# Patient Record
Sex: FEMALE | Race: WHITE
[De-identification: names, ages, dates, MRNs, and addresses within clinical notes are randomized per-mention and may not be internally consistent; named-entity substitution may affect disease eponyms.]

---

## 2019-01-30 ENCOUNTER — HOSPITAL ENCOUNTER (OUTPATIENT)
Dept: HOSPITAL 88 - MAMMO | Age: 69
End: 2019-01-30
Attending: OBSTETRICS & GYNECOLOGY
Payer: MEDICARE

## 2019-01-30 DIAGNOSIS — N64.89: Primary | ICD-10-CM

## 2019-01-31 NOTE — DIAGNOSTIC IMAGING REPORT
#[id number] - [id number]

#UNILATERAL RIGHT DIGITAL DIAGNOSTIC MAMMOGRAM WITH SPOT COMPRESSION: 1/30/2019

Comparison is made to exam dated:  12/31/2018 mammogram - [facility].  

Current study contains 2 films.  

There are scattered fibroglandular elements in the right breast.  

There are benign calcifications in the right breast.  The density previously described appears to 

press out.  No mass seen. 

No significant masses, calcifications, or other findings are seen in the breast.  

There has been no significant interval change.



IMPRESSION: BENIGN

There is no mammographic evidence of malignancy.  A 1 year screening mammogram is recommended. 

The patient will be notified by letter of the results.  





Kevin Tariq Jr., D.O.          

cw/:1/30/2019 12:33:36  



Imaging Technologist: Astrid GONZALES(R)(M), [facility]

letter sent: Normal Exam  

Mammogram BI-RADS: 2 Benign

## 2020-09-27 ENCOUNTER — HOSPITAL ENCOUNTER (INPATIENT)
Dept: HOSPITAL 88 - ER | Age: 70
LOS: 2 days | Discharge: HOME | DRG: 247 | End: 2020-09-29
Attending: INTERNAL MEDICINE | Admitting: INTERNAL MEDICINE
Payer: MEDICARE

## 2020-09-27 VITALS — BODY MASS INDEX: 33.38 KG/M2 | HEIGHT: 60 IN | WEIGHT: 170 LBS

## 2020-09-27 DIAGNOSIS — E11.9: ICD-10-CM

## 2020-09-27 DIAGNOSIS — G47.419: ICD-10-CM

## 2020-09-27 DIAGNOSIS — E78.5: ICD-10-CM

## 2020-09-27 DIAGNOSIS — K21.9: ICD-10-CM

## 2020-09-27 DIAGNOSIS — I24.9: ICD-10-CM

## 2020-09-27 DIAGNOSIS — E66.9: ICD-10-CM

## 2020-09-27 DIAGNOSIS — Z98.84: ICD-10-CM

## 2020-09-27 DIAGNOSIS — G47.33: ICD-10-CM

## 2020-09-27 DIAGNOSIS — I10: ICD-10-CM

## 2020-09-27 DIAGNOSIS — Z95.5: ICD-10-CM

## 2020-09-27 DIAGNOSIS — I25.2: ICD-10-CM

## 2020-09-27 DIAGNOSIS — I25.110: Primary | ICD-10-CM

## 2020-09-27 DIAGNOSIS — M10.9: ICD-10-CM

## 2020-09-27 LAB
ALBUMIN SERPL-MCNC: 4.7 G/DL (ref 3.5–5)
ALBUMIN/GLOB SERPL: 1.4 {RATIO} (ref 0.8–2)
ALP SERPL-CCNC: 105 IU/L (ref 40–150)
ALT SERPL-CCNC: 9 IU/L (ref 0–55)
ANION GAP SERPL CALC-SCNC: 19.4 MMOL/L (ref 8–16)
BASOPHILS # BLD AUTO: 0 10*3/UL (ref 0–0.1)
BASOPHILS NFR BLD AUTO: 0.9 % (ref 0–1)
BUN SERPL-MCNC: 38 MG/DL (ref 7–26)
BUN/CREAT SERPL: 28 (ref 6–25)
CALCIUM SERPL-MCNC: 11.1 MG/DL (ref 8.4–10.2)
CHLORIDE SERPL-SCNC: 107 MMOL/L (ref 98–107)
CK MB SERPL-MCNC: 2 NG/ML (ref 0–5)
CK SERPL-CCNC: 107 IU/L (ref 29–168)
CO2 SERPL-SCNC: 19 MMOL/L (ref 22–29)
DEPRECATED APTT PLAS QN: 27.5 SECONDS (ref 23.8–35.5)
DEPRECATED INR PLAS: 0.78
DEPRECATED NEUTROPHILS # BLD AUTO: 2.8 10*3/UL (ref 2.1–6.9)
EGFRCR SERPLBLD CKD-EPI 2021: 38 ML/MIN (ref 60–?)
EOSINOPHIL # BLD AUTO: 0.1 10*3/UL (ref 0–0.4)
EOSINOPHIL NFR BLD AUTO: 2.1 % (ref 0–6)
ERYTHROCYTE [DISTWIDTH] IN CORD BLOOD: 14.2 % (ref 11.7–14.4)
GLOBULIN PLAS-MCNC: 3.4 G/DL (ref 2.3–3.5)
GLUCOSE SERPLBLD-MCNC: 182 MG/DL (ref 74–118)
HCT VFR BLD AUTO: 38.5 % (ref 34.2–44.1)
HGB BLD-MCNC: 12.8 G/DL (ref 12–16)
LYMPHOCYTES # BLD: 0.9 10*3/UL (ref 1–3.2)
LYMPHOCYTES NFR BLD AUTO: 21.1 % (ref 18–39.1)
MCH RBC QN AUTO: 28.8 PG (ref 28–32)
MCHC RBC AUTO-ENTMCNC: 33.2 G/DL (ref 31–35)
MCV RBC AUTO: 86.5 FL (ref 81–99)
MONOCYTES # BLD AUTO: 0.4 10*3/UL (ref 0.2–0.8)
MONOCYTES NFR BLD AUTO: 9.9 % (ref 4.4–11.3)
NEUTS SEG NFR BLD AUTO: 64.9 % (ref 38.7–80)
PLATELET # BLD AUTO: 243 X10E3/UL (ref 140–360)
POTASSIUM SERPL-SCNC: 4.4 MMOL/L (ref 3.5–5.1)
PROTHROMBIN TIME: 11.3 SECONDS (ref 11.9–14.5)
RBC # BLD AUTO: 4.45 X10E6/UL (ref 3.6–5.1)
SODIUM SERPL-SCNC: 141 MMOL/L (ref 136–145)

## 2020-09-27 PROCEDURE — 99153 MOD SED SAME PHYS/QHP EA: CPT

## 2020-09-27 PROCEDURE — 85025 COMPLETE CBC W/AUTO DIFF WBC: CPT

## 2020-09-27 PROCEDURE — 84484 ASSAY OF TROPONIN QUANT: CPT

## 2020-09-27 PROCEDURE — 83735 ASSAY OF MAGNESIUM: CPT

## 2020-09-27 PROCEDURE — 93306 TTE W/DOPPLER COMPLETE: CPT

## 2020-09-27 PROCEDURE — 82553 CREATINE MB FRACTION: CPT

## 2020-09-27 PROCEDURE — 92928 PRQ TCAT PLMT NTRAC ST 1 LES: CPT

## 2020-09-27 PROCEDURE — 99152 MOD SED SAME PHYS/QHP 5/>YRS: CPT

## 2020-09-27 PROCEDURE — 83036 HEMOGLOBIN GLYCOSYLATED A1C: CPT

## 2020-09-27 PROCEDURE — 83880 ASSAY OF NATRIURETIC PEPTIDE: CPT

## 2020-09-27 PROCEDURE — 36415 COLL VENOUS BLD VENIPUNCTURE: CPT

## 2020-09-27 PROCEDURE — 85730 THROMBOPLASTIN TIME PARTIAL: CPT

## 2020-09-27 PROCEDURE — 82948 REAGENT STRIP/BLOOD GLUCOSE: CPT

## 2020-09-27 PROCEDURE — 93005 ELECTROCARDIOGRAM TRACING: CPT

## 2020-09-27 PROCEDURE — 93458 L HRT ARTERY/VENTRICLE ANGIO: CPT

## 2020-09-27 PROCEDURE — 71045 X-RAY EXAM CHEST 1 VIEW: CPT

## 2020-09-27 PROCEDURE — 80061 LIPID PANEL: CPT

## 2020-09-27 PROCEDURE — 99284 EMERGENCY DEPT VISIT MOD MDM: CPT

## 2020-09-27 PROCEDURE — 82550 ASSAY OF CK (CPK): CPT

## 2020-09-27 PROCEDURE — 84443 ASSAY THYROID STIM HORMONE: CPT

## 2020-09-27 PROCEDURE — 80053 COMPREHEN METABOLIC PANEL: CPT

## 2020-09-27 PROCEDURE — 85610 PROTHROMBIN TIME: CPT

## 2020-09-27 PROCEDURE — 84100 ASSAY OF PHOSPHORUS: CPT

## 2020-09-27 NOTE — XMS REPORT
Continuity of Care Document

                             Created on: 2020



SUPRIYA MOULTON

External Reference #: [id number]

: 1950

Sex: Female



Demographics





                          Address                   [address] [place]  [address]

 

                          Home Phone                [phone]

 

                          Preferred Language        English

 

                          Marital Status            Unknown

 

                          Zoroastrianism Affiliation     Unknown

 

                          Race                      Unknown

 

                                        Additional Race(s) 



White



 

                          Ethnic Group              Unknown





Author





                          Author                    [facility]

t

 

                          Organization              [facility]

 

                          Address                   [address]

[place]  [address]



 

                          Phone                     Unavailable







Support





                Name            Relationship    Address         Phone

 

                Luc Moulton ECON            Unknown         [phone]







Care Team Providers





                    Care Team Member Name Role                Phone

 

                    Brandi SILVA, Eli Garza PCP                 [phone]

 

                    PHYLLIS NUÑEZ Attphys             Unavailable

 

                    ESTEVAN MARIEE P.AJuve Attphys             Unavailable

 

                    THIAGO LIRIANO M.D. Attphys             Unavailable

 

                    Yunior Stanford MD Attphys             [phone]

 

                    PHYLLIS Rodriguez RD Attphys             Unavailable

 

                    Kyle KAPOOR, PEMA Sanders Attphys             Unavailable

 

                    NACHO Larson MD Attphys             [phone]

 

                    Verna Emmanuel Attphys             [phone]

 

                    Fahad SILVA, Atif Farnsworth Attphys             [phone]

 

                    Christina Colon MA  Attphys             Unavailable

 

                    TRACIE UNDERWOOD, APRN   Attphys             Unavailable

 

                    MAYNOR DONG   Attphys             Unavailable

 

                    LION WHARTON APRN  Attphys             Unavailable

 

                    CYNTHIA TALBOT NP Attphys             Unavailable

 

                    LUKE KIMBALL P.A. Attphys             Unavailable

 

                    LUCAS XAVIER M.D. Attphys             Unavailable

 

                    SERGEI ZAVALA NP Attphys             Unavailable

 

                    TRACIE UNDEROWOD NP     Attphys             Unavailable

 

                    MARIO JEAN M.D. Attphys             Unavailable

 

                    PENNY HE P.A. Attphys             Unavailable

 

                    SIMON CARR NP    Attphys             Unavailable

 

                    LUCERO STANFORD     Admphys             Unavailable







Payers





           Payer Name Policy Type Policy Number Effective Date Expiration Date S

ource MEDICAREMEDICARE PART A AND [id number]                     

[id number]         2016 00:00:00                     Kal Rdz

 

                [place] OF DIGNA OF [id number]

l                 [id number]       

2017 00:00:00                                 Kal Rdz







Problems





           Condition Name Condition Details Condition Category Status     Onset 

Date Resolution

Date            Last Treatment Date Treating Clinician Comments        Source

 

                          History of removal of laparoscopic gastric banding dev

ice History of removal of 

laparoscopic gastric banding device Disease Active  2020 00:00:00         

                        

Kal Rdz

 

                    GERD (gastroesophageal reflux disease) GERD (gastroesophagea

l reflux disease) 

Disease   Active    2019-10-24 00:00:00                                         

Kal Rdz

 

                          History of laparoscopic adjustable gastric banding His

tory of laparoscopic 

adjustable gastric banding Disease Active  2019-10-24 00:00:00                  

               Kal Rdz

 

       Hypertension Hypertension Disease Active 2019-10-24 00:00:00             

                Kal Rdz

 

       Hyperlipidemia Hyperlipidemia Disease Active 2019-10-24 00:00:00         

                    Kal dRz

 

             CAD (coronary artery disease) CAD (coronary artery disease) Disease

      Active       

2019-10-24 00:00:00                                                     Kal Rdz

 

       Diabetes mellitus Diabetes mellitus Disease Active 2019-10-24 00:00:00   

                          

Kal Rdz

 

                Obstructive sleep apnea syndrome Obstructive sleep apnea syndrom

e Disease         Active

           2019-10-24 00:00:00                                             [place]

[facility]

 

             Quang's deformity, right Quang's deformity, right Disease      

Active       2019 

00:00:00                                                         Kal Hoyos

st

 

                          History of Diabetes Mellitus Without Complication Hist

ory of Diabetes Mellitus 

Without Complication Problem Resolved                                         Un

[facility]

 

       History of cough History of cough Problem Resolved                       

             [facility]

[facility]

 

       History of snoring History of snoring Problem Resolved                   

                 [facility]

 

                          History of Abnormal finding on urinalysis History of A

bnormal finding on 

urinalysis Problem Resolved                                         [facility]

[facility]

 

                          History of Acute bronchitis due to infection History o

f Acute bronchitis due to 

infection Problem Resolved                                         [facility]

 

                          History of Acute drug-induced gout of foot, unspecifie

d laterality History of 

Acute drug-induced gout of foot, unspecified laterality Problem    Resolved     

                                

                                                    [facility]

ns

 

       Gout   Gout   Problem Active                                    [facility]

 

                          History of Acute maxillary sinusitis, recurrence not s

pecified History of Acute 

maxillary sinusitis, recurrence not specified Problem Resolved                  

                       

[facility]

 

                          History of Acute recurrent maxillary sinusitis History

 of Acute recurrent 

maxillary sinusitis Problem Resolved                                         [facility]

 

                          History of Acute upper respiratory infection History o

f Acute upper respiratory 

infection Problem Resolved                                         [facility]

 

       History of Acute UTI History of Acute UTI Problem Resolved               

                     [facility]

 

           History of contact dermatitis History of contact dermatitis Problem  

  Resolved               

                                                                [facility]

 

       History of hyperlipidemia History of hyperlipidemia Problem Resolved     

                               

[facility]

 

          Diabetes type 2, uncontrolled Diabetes type 2, uncontrolled Problem   

Active                         

                                                            [facility] 

[facility]

 

                History of essential hypertension History of essential hypertens

ion Problem         

Resolved                                                          [facility]

 

       History of hematuria History of hematuria Problem Resolved               

                     [facility]

 

                History of viral gastroenteritis History of viral gastroenteriti

s Problem         

Resolved                                                          [facility]

 

        History of Intercostal pain History of Intercostal pain Problem Resolved

                          

                                                    [facility]

ns

 

       Personal history of gout Personal history of gout Problem Resolved       

                             

[facility]

 

                History of myocardial infarction History of myocardial infarctio

n Problem         

Resolved                                                          [facility]

 

       Narcolepsy Narcolepsy Problem Active                                    [facility]

 

                History of Noninfectious diarrhea History of Noninfectious diarr

hea Problem         

Resolved                                                          [facility]

 

       History of Other fatigue History of Other fatigue Problem Resolved       

                             

[facility]

 

                    History of Pain, foot, left, chronic History of Pain, foot, 

left, chronic 

Problem   Resolved                                                    [facility]

 

        History of Right foot pain History of Right foot pain Problem Resolved  

                                

                                        [facility]

 

       History of sciatica History of sciatica Problem Resolved                 

                   [facility]

 

                          History of Sciatica of right side associated with diso

rder of lumbar spine 

History of Sciatica of right side associated with disorder of lumbar spine 

Problem   Resolved                                                    [facility]

 

       History of sinusitis History of sinusitis Problem Resolved               

                     [facility]

 

                    History of Swelling of foot joint, left History of Swelling 

of foot joint, left 

Problem   Resolved                                                    [facility]

 [facility]

 

       Edema  Edema  Problem Active                                    [facility]

 

       Amezcua neuroma, left Amezcua neuroma, left Problem Active                 

                   [facility]

 

       Post herpetic neuralgia Post herpetic neuralgia Problem Active           

                         

[facility]

 

        Bleeding risk due to aspirin Bleeding risk due to aspirin Problem Active

                           

                                                    [facility]

ns

 

       Hypertriglyceridemia Hypertriglyceridemia Problem Active                 

                   [facility]

 

       Other insomnia Other insomnia Problem Active                             

       [facility] 

[facility]

 

       Lumbar facet arthropathy Lumbar facet arthropathy Problem Active         

                           

[facility]

 

                          Lumbar adjacent segment disease with spondylolisthesis

 Lumbar adjacent segment 

disease with spondylolisthesis Problem Active                                   

       [facility] 

[facility]

 

                    Bulge of lumbar disc without myelopathy Bulge of lumbar disc

 without myelopathy 

Problem   Active                                                      [facility]

 

                          Antiplatelet or antithrombotic long-term use Antiplate

let or antithrombotic 

long-term use Problem Active                                          [facility]

 

       Vitamin D deficiency Vitamin D deficiency Problem Active                 

                   [facility]

 

       Hypercalcemia Hypercalcemia Problem Active                               

     [facility] 

[facility]

 

                Controlled type 2 diabetes mellitus Controlled type 2 diabetes m

ellitus Problem         

Active                                                            [facility]

 

                Essential (primary) hypertension Essential (primary) hypertensio

n Problem         Active

                                                                  [facility]

 

       Hyperlipidemia Hyperlipidemia Problem Active                             

       [facility] 

[facility]

 

       Depressive disorder Depressive disorder Problem Active                   

                 [facility]

 

       Acute sinusitis Acute sinusitis Problem Active                           

         [facility]

 

       Anxiety with depression Anxiety with depression Problem Active           

                         

[facility]

 

       Obesity (BMI 30.0-34.9) Obesity (BMI 30.0-34.9) Problem Active           

                         

[facility]

 

       Vaginitis Vaginitis Problem Active                                    [facility]







Allergies, Adverse Reactions, Alerts





        Allergy Name Allergy Type Status  Severity Reaction(s) Onset Date Inacti

ve Date 

Treating Clinician        Comments                  Source

 

           Codeine    Propensity to adverse reactions to drug Active            

    GI Intolerance 

2020 00:00:00                                 n/v             [facility]

[facility]







Family History





           Family Member Diagnosis  Comments   Start Date Stop Date  Source

 

           Unknown Family Member Family history of Stroke Syndrome Family Histor

y                       

[facility]

 

           Grandmother Family history of Heart Disease                          

        [facility]

 

           Grandmother Family history of Diabetes Mellitus                      

            [facility]

 

           Grandmother Family history of Hypertension                           

       [facility]

 

           Grandmother Family history of Pure Hypercholesterolemia              

                    [facility]

 

           Mother     Family history of Heart Disease                           

       [facility]

 

           Mother     Family history of Diabetes Mellitus                       

           [facility]

 

           Mother     Family history of Hypertension                            

      [facility]

 

           Mother     Family history of Pure Hypercholesterolemia               

                   [facility]

 

           Father     Family history of Heart Disease                           

       [facility]

 

           Father     Family history of Hypertension                            

      [facility]

 

           Father     Family history of Pure Hypercholesterolemia               

                   [facility]

 

           Natural father Alcohol abuse                                  [place]

 [facility]

 

           Natural father Heart attack                                  [place] 

[facility]

 

           Natural father Heart disease                                  [place]

 [facility]

 

           Natural mother Alcohol abuse                                  [place]

 [facility]

 

           Natural mother Aneurysm                                    [facility]

[facility]

 

           Natural mother Diabetes                                    [place] [facility]

[facility]

 

           Natural sister Cancer                                      [place]

[facility]







Social History





           Social Habit Start Date Stop Date  Quantity   Comments   Source

 

           History SDOH Alcohol Std Drinks                                      

       [facility]

 

           History SDOH Alcohol Binge                                           

  [facility]

 

           Sex Assigned At Birth                                             [facility]

 

           Tobacco use and exposure 2020 00:00:00 2020 00:00:00 Annabel maya used            

[facility]

 

                Alcohol intake  2020 00:00:00 2020 00:00:00 Current 

non-drinker of 

alcohol (finding)                                   [facility]

 

           History SDOH Alcohol Frequency 2019 00:00:00 2019 00:00:0

0 1                     

Kal Rdz







                Smoking Status  Start Date      Stop Date       Source

 

                Ex-smoker (finding)                                 [facility]

 

                Never smoker                                    Kal Santoyo

t







Medications





             Ordered Medication Name Filled Medication Name Start Date   Stop Da

te    Current 

Medication? Ordering Clinician Indication Dosage     Frequency  Signature (SIG) 

Comments                  Components                Source

 

                          Nystatin 485766 UNIT/GM External Ointment Nystatin 100

000 UNIT/GM External 

Ointment 2020 00:00:00         Yes     ESTEVAN MARIEE P.A.                 

Q0.3333D APPLY 2-3 

TIMES DAILY TO AFFECTED AREA(S).                                         [facility]

 

                    Nystatin 619621 UNIT/GM External Powder Nystatin 583744 UNIT

/GM External Powder 

2020 00:00:00           Yes       ESTEVAN MARIEE P.A.                     Q

0.3333D  APPLY 2-3 TIMES DAILY 

TO AFFECTED AREA(S).                                         [facility]

 [facility]

 

                          Vitamin D (Ergocalciferol) 1.25 MG (39058 UT) Oral Cap

latricia Vitamin D 

(Ergocalciferol) 1.25 MG (06599 UT) Oral Capsule 2020 00:00:00            

     Yes             THIAGO LIRIANO M.D.                               take 1 cap PO once weekly             

        [facility]

 

             cholecalciferol, vitamin D3, (DIALYVITE VITAMIN D ORAL)            

  2020 11:22:59              

Yes                                     Take by mouth.                 Kal KUMAR

ethodi

 

                    insulin regular, human (NOVOLIN R REGULAR U-100 INSULN INJ) 

                    2020 

11:22:59         Yes                     20U     Q.5D    Inject 20 Units as dire

cted 2 (two) times a day.          

                                        Kal Rdz

 

       carvedilol (COREG) 3.125 MG tablet        2020 11:22:59        Yes 

                 25mg   Q.5D   

Take 25 mg by mouth 2 (two) times a day with meals.                             

             Kal Rdz

 

        aspirin (ECOTRIN) 81 MG enteric coated tablet         2020 11:22:5

9         Yes                     

81mg         QD           Take 81 mg by mouth daily.                           LAZARO Rdz

 

       atorvastatin (LIPITOR) 40 MG tablet        2020 11:22:59        Yes

                  40mg   QD     Take

40 mg by mouth daily.                                         Kal Rdz

 

       famotidine (PEPCID) 20 MG tablet        2020 11:22:59        Yes   

               20mg   Q.5D   Take 

20 mg by mouth 2 (two) times a day as needed for indigestion.                   

                       Kal Rdz

 

        nitroglycerin (NITROSTAT) 0.4 MG SL tablet         2020 11:22:59  

       Yes                     .4mg    

                          Place 0.4 mg under the tongue every 5 (five) minutes a

s needed for chest pain. 

                                                    Kal Rdz

 

       irbesartan (AVAPRO) 300 MG tablet        2020 11:22:59        Yes  

                300mg  QD     Take 

300 mg by mouth nightly.                                         Kal Hoyos

st

 

       UNABLE TO FIND        2020 13:24:28 2020 00:00:00 No         

                        Med Name: 

***                                                         Kal Rdz

 

                isosorbide dinitrate (ISORDIL) 40 MG tablet                 2020 06:52:43 2020 

00:00:00   No                               40mg       Q.[id number] Ta

ke 40 mg by mouth 3 (three) times 

a day.                                                      Kal Rdz

 

                HYDROcodone-acetaminophen (NORCO) 5-325 mg per tablet           

      2020 00:00:00 

2020 23:59:00 No                    acute pain 1{tbl}     Q6H        Take 

1-2 tablets by mouth every 6

(six) hours as needed for moderate pain or severe pain for up to 30 days .acute 
pain.                                                       Kal Rdz

 

           modafinil (PROVIGIL) 100 MG tablet            2020 13:21:27  00:00:00 No         

                    100mg     QD        Take 100 mg by mouth daily.             

        Kal Rdz

 

                ibuprofen (ADVIL,MOTRIN) 800 MG tablet                 2019-10-2

4 12:12:43 2019-10-24 00:00:00

           No                               800mg      Q6H        Take 800 mg by

 mouth every 6 (six) hours as needed for mild 

pain.                                                       Kal Rdz

 

          fexofenadine HCl (ALLEGRA ORAL)           2019-10-24 12:12:39 2019-10-

24 00:00:00 No                   

                                 Take by mouth.                       Kal avitia

 

        metformin HCl (METFORMIN ORAL)         2019-10-24 12:12:27 2019-10-24 00

:00:00 No                      

1000mg       Q.5D         Take 1,000 mg by mouth 2 (two) times a day.           

                 Kal Rdz

 

                          ReliOn Insulin Syringe 31G X 15/64" 0.3 ML ReliOn Insu

ronny Syringe 31G X 15/64" 

0.3 ML 2019 00:00:00        Yes    THIAGO LIRIANO M.D.                      

use to inject 3-5xs         

                                        [facility]

 

                          NovoLIN R ReliOn 100 UNIT/ML Injection Solution NovoLI

N R ReliOn 100 UNIT/ML 

Injection Solution 2019 00:00:00         Yes     THIAGO LIRIANO M.D.        

                 inject 5-10

U SC q1/2AC                                                 [facility] 

Physicians

 

          ondansetron (ZOFRAN) 4 MG tablet           2019 00:00:00 2019-10

-24 00:00:00 No                  

Quang's deformity, right 4mg                 Q8H                 Take 1 tablet

 (4 mg total) by mouth every 8 

(eight) hours as needed for nausea or vomiting.                                 

        Kal Rdz

 

           irbesartan (AVAPRO) 300 MG tablet            2019 00:00:00 2019

-10-24 00:00:00 No          

                    300mg     QD        Take 300 mg by mouth daily.             

        Kal Rdz

 

       clopidogrel (PLAVIX) 75 mg tablet        2019 00:00:00        Yes  

                75mg   QD     Take 

75 mg by mouth daily.                                         Kal Rdz

 

             allopurinol (ZYLOPRIM) 300 MG tablet              2018 00:00:

00 2019-10-24 00:00:00 

No                      300mg           300 mg.                 Kal Santoyo

t

 

           fenofibrate (TRICOR) 145 MG tablet            2018 00:00:00 201

9-10-24 00:00:00 No         

                                        TAKE ONE (1) TABLET(S) BY MOUTH DAILY.  

                   Kal BallardOn Blood Glucose Test In Vitro Strip ReliOn Blood 

Glucose Test In Vitro 

Strip   2018 00:00:00         Yes     THIAGO LIRIANO M.D.                 Q0

.5D   use to check BG at 

least 3xs daily                                             [facility] 

Physicians

 

                          NovoLIN N ReliOn 100 UNIT/ML Subcutaneous Suspension N

ovoLIN N ReliOn 100 

UNIT/ML Subcutaneous Suspension 2018 00:00:00           Yes       THIAGO PAT M.D.                     

                inject 20 U in AM and 24 U in PM SC                             

    [facility]

 

                    D 5000 125 MCG (5000 UT) Oral Capsule D 5000 125 MCG (5000 U

T) Oral Capsule 

2017-10-17 00:00:00        Yes    THIAGO LIRIANO M.D.                      take 1 

tab daily               

[facility]

 

                    Allopurinol 300 MG Oral Tablet Allopurinol 300 MG Oral Table

t 2017-10-06 

00:00:00        Yes    LION HOLLINGSWORTH        1      QD     TAKE 1 TABLET DAILY 

AS DIRECTED.               

[facility]

 

                Irbesartan 300 MG Oral Tablet Irbesartan 300 MG Oral Tablet 2013 00:00:00 

        Yes     ESTEVAN NEGRETEOS P.A.         1       QD      TAKE 1 TABLET DAILY. 

                [facility] 

[facility]

 

                    metFORMIN HCl - 500 MG Oral Tablet metFORMIN HCl - 500 MG Or

al Tablet 2013

00:00:00         Yes     THIAGO LIRIANO M.D.                 Q0.5D   TAKE 2 TABLET

S BY MOUTH TWICE DAILY  

                                                    [facility]

 

     Aspirin 81 MG TABS Aspirin 81 MG TABS           Yes            1    QD   TA

KE 1 TABLET DAILY.           

[facility]

 

                    Clopidogrel Bisulfate 75 MG Oral Tablet Clopidogrel Bisulfat

e 75 MG Oral Tablet 

              Yes                  1      QD     TAKE 1 TABLET DAILY.           

    [facility]

 

     Allegra CAPS Allegra CAPS           Yes                      as needed     

      [facility] 

[facility]

 

     Ibuprofen TABS Ibuprofen TABS           Yes                                

     [facility] 

[facility]

 

       Carvedilol 12.5 MG Oral Tablet Carvedilol 12.5 MG Oral Tablet            

   Yes                                2 

tabs BID                                                    [facility] 

[facility]

 

       Fenofibrate 145 MG Oral Tablet Fenofibrate 145 MG Oral Tablet            

   Yes                         QD     

TAKE 1 TABLET DAILY WITH FOOD.                                         [facility]







Immunizations





           Ordered Immunization Name Filled Immunization Name Date       Status 

    Comments   Source

 

           Influenza             2007-10-16 00:00:00 Completed             [facility]

 

                Fluzone High-Dose 0.5 ML Intramuscular Suspension Prefilled Syri

nge                 Unknown         

Completed                                           [facility]

ns

 

           Shingrix 50 MCG Intramuscular Suspension Reconstituted            Unk

nown    Completed             

[facility]







Vital Signs





             Vital Name   Observation Time Observation Value Comments     Source

 

                Systolic blood pressure 2020 10:52:00 148 mm[Hg]      Loca

tion: LUE; Position: 

Sitting                                 [facility]

 

                Diastolic blood pressure 2020 10:52:00 72 mm[Hg]       Loc

ation: LUE; Position: 

Sitting                                 [facility]

 

             Body height  2020 10:52:00 60 [in_us]                [facility]

 

             Weight       2020 10:52:00 172.4375 [lb_av]              [facility]

 

             Body mass index (BMI) [Ratio] 2020 10:52:00 33.68 kg/m2      

         [facility]

 

             Body temperature 2020 10:52:00 99 [degF]    Method: Temporal 

[facility]

 

             Heart Rate   2020 10:52:00 89 /min                   [facility]

 

             Respiratory rate 2020 10:52:00 16 /min                   [facility]

 

                Systolic blood pressure 2020 11:27:00 167 mm[Hg]      Loca

tion: LUE; Position: 

Sitting                                 [facility]

 

                Diastolic blood pressure 2020 11:27:00 89 mm[Hg]       Loc

ation: LUE; Position: 

Sitting                                 [facility]

 

             Heart Rate   2020 11:27:00 69 /min      Location: L Radial; Q

uality: Normal 

[facility]

 

                Systolic blood pressure 2020 11:24:00 172 mm[Hg]      Loca

tion: LUE; Position: 

Sitting                                 [facility]

 

                Diastolic blood pressure 2020 11:24:00 70 mm[Hg]       Loc

ation: LUE; Position: 

Sitting                                 [facility]

 

             Heart Rate   2020 11:24:00 69 /min      Location: L Radial; Q

uality: Normal 

[facility]

 

             Body height  2020 11:24:00 60 [in_us]                [facility]

 

             Weight       2020 11:24:00 169.5625 [lb_av]              [facility]

 

             Body mass index (BMI) [Ratio] 2020 11:24:00 33.12 kg/m2      

         [facility]

 

             Body temperature 2020 11:24:00 97.8 [degF]  Method: Oral [facility]

 

             Body height  2020 11:16:00 152.4 cm                  [facility]

 

             Body weight  2020 11:16:00 74.844 kg                 [facility]

 

             BMI          2020 11:16:00 32.22 kg/m2               [facility]

 

                Systolic blood pressure 2020 10:00:00 123 mm[Hg]      Loca

tion: LUE; Position: 

Sitting                                 [facility]

 

                Diastolic blood pressure 2020 10:00:00 68 mm[Hg]       Loc

ation: LUE; Position: 

Sitting                                 [facility]

 

             Body height  2020 10:00:00 60 [in_us]                [facility]

 

             Weight       2020 10:00:00 162.5625 [lb_av]              [facility]

 

             Body mass index (BMI) [Ratio] 2020 10:00:00 31.75 kg/m2      

         [facility]

 

             Heart Rate   2020 10:00:00 69 /min                   [facility]

 

                Systolic blood pressure 2020 10:13:00 158 mm[Hg]      Loca

tion: LUE; Position: 

Sitting                                 [facility]

 

                Diastolic blood pressure 2020 10:13:00 82 mm[Hg]       Loc

ation: LUE; Position: 

Sitting                                 [facility]

 

             Body height  2020 10:13:00 60 [in_us]                [facility]

 

             Weight       2020 10:13:00 167.375 [lb_av]              [facility]

 

             Body mass index (BMI) [Ratio] 2020 10:13:00 32.69 kg/m2      

         [facility]

 

             Body temperature 2020 10:13:00 98.4 [degF]  Method: Oral [facility]

 

             Heart Rate   2020 10:13:00 69 /min      Location: L Brachial 

Artery;  [facility]

 

             O2 SAT       2020 10:13:00 97 %         Source: RA   [facility]

 

             Systolic blood pressure 2020 09:25:00 185 mm[Hg]             

   [facility]

 

             Diastolic blood pressure 2020 09:25:00 95 mm[Hg]             

    [facility]

 

             Heart rate   2020 09:25:00 78 /min                   [facility]

 

             Body temperature 2020 09:25:00 36.67 Carolyn                 [facility]

 

             Respiratory rate 2020 12:28:00 20 /min                   [facility]

 

                    Oxygen saturation in Arterial blood by Pulse oximetry  12:28:00 95 

/min                                                [facility]

 

             BP Systolic  2019 10:14:00 150 mm[Hg]   Location: SKYLA; Positi

on: Sitting 

[facility]

 

             BP Diastolic 2019 10:14:00 75 mm[Hg]    Location: LUE; Positi

on: Sitting 

[facility]

 

             Height       2019 10:14:00 60 [in_us]                [facility]

 

             Weight       2019 10:14:00 163.25 [lb_av]              [facility]

 

             Body Mass Index Calculated 2019 10:14:00 31.88 kg/m2         

      [facility]

 [facility]

 

             Heart Rate   2019 10:14:00 51 /min                   [facility]

 

             BP Systolic  2019 09:34:00 124 mm[Hg]   Location: LUE; Positi

on: Sitting 

[facility]

 

             BP Diastolic 2019 09:34:00 63 mm[Hg]    Location: LUE; Pos[facility]

on: Sitting 

[facility]

 

             Height       2019 09:34:00 60 [in_us]                [facility]

 

             Weight       2019 09:34:00 160.125 [lb_av]              [facility]

 

             Body Mass Index Calculated 2019 09:34:00 31.27 kg/m2         

      [facility]

 [facility]

 

             Heart Rate   2019 09:34:00 71 /min                   [facility]

 

             BP Systolic  2019 09:31:00 135 mm[Hg]   Location: KERRYE; Positi

on: Sitting 

[facility]

 

             BP Diastolic 2019 09:31:00 70 mm[Hg]    Location: LUE; Positi

on: Sitting 

[facility]

 

             Height       2019 09:31:00 60 [in_us]                [facility]

 

             Weight       2019 09:31:00 158.0 [lb_av]              [facility]

 

             Body Mass Index Calculated 2019 09:31:00 30.86 kg/m2         

      [facility]

 [facility]

 

             Heart Rate   2019 09:31:00 63 /min                   [facility]

 

             BP Systolic  2019 10:47:00 130 mm[Hg]   Location: LUE; Positi

on: Sitting 

[facility]

 

             BP Diastolic 2019 10:47:00 75 mm[Hg]    Location: LUE; Pos[place]

on: Sitting 

[facility]

 

             Height       2019 10:47:00 60 [in_us]                [facility]

 

             Weight       2019 10:47:00 162.375 [lb_av]              Unive

[facility]

 

             Body Mass Index Calculated 2019 10:47:00 31.71 kg/m2         

      [facility]

 [facility]

 

             Temperature  2019 10:47:00 98.4 [degF]  Method: Temporal [facility]

 

             Heart Rate   2019 10:47:00 74 /min      Location: L Brachial 

Artery;  [facility]

 

             Respiration Rate 2019 10:47:00 16 /min      [facility]

 

             BP Systolic  2018 09:42:00 150 mm[Hg]   Location: RUE; Positi

on: Sitting 

[facility]

 

             BP Diastolic 2018 09:42:00 77 mm[Hg]    Location: RUE; Pos[place]

on: Sitting 

[facility]

 

             Height       2018 09:42:00 60 [in_us]                [facility]

 

             Weight       2018 09:42:00 147.5625 [lb_av]              [facility]

 

             Body Mass Index Calculated 2018 09:42:00 28.82 kg/m2         

      [facility]

 

             Temperature  2018 09:42:00 98.5 [degF]  Method: Temporal [facility]

 [facility]

 

             Heart Rate   2018 09:42:00 77 /min                   [facility]

 

             Respiration Rate 2018 09:42:00 14 /min                   [facility]

 

             BP Systolic  2018 10:53:00 150 mm[Hg]   Location: LUE; Positi

on: Sitting 

[facility]

 

             BP Diastolic 2018 10:53:00 70 mm[Hg]    Location: LUE; [place]

on: Sitting 

[facility]

 

             BP Systolic  2018 10:04:00 166 mm[Hg]   Location: LUE; [place]

on: Sitting 

[facility]

 

             BP Diastolic 2018 10:04:00 72 mm[Hg]    Location: LUE; [place]

on: Sitting 

[facility]

 

             Height       2018 10:04:00 60 [in_us]                [facility]

 

             Weight       2018 10:04:00 148.375 [lb_av]              Unive

[facility]

 

             Body Mass Index Calculated 2018 10:04:00 28.98 kg/m2         

      [facility]

 [facility]

 

             Heart Rate   2018 10:04:00 57 /min                   [facility]

 

             BP Systolic  2018 13:06:00 159 mm[Hg]   Location: SKYLA; Positi

on: Sitting 

[facility]

 

             BP Diastolic 2018 13:06:00 70 mm[Hg]    Location: KERRYE; [place]

on: Sitting 

[facility]

 

             Height       2018 13:06:00 60 [in_us]                [facility]

 

             Weight       2018 13:06:00 160.1875 [lb_av]              [facility]

 

             Body Mass Index Calculated 2018 13:06:00 31.28 kg/m2         

      [facility]

 

             Heart Rate   2018 13:06:00 80 /min                   [facility]

 

             BP Systolic  2018-06-15 14:44:00 138 mm[Hg]   Location: KERRYE; Positi

on: Sitting 

[facility]

 

             BP Diastolic 2018-06-15 14:44:00 76 mm[Hg]    Location: KERRYE; Pos[facility]

on: Sitting 

[facility]

 

             Heart Rate   2018-06-15 14:44:00 72 /min      Location: L Brachial 

Artery;  [facility]

 

             BP Systolic  2018-06-15 13:24:00 144 mm[Hg]   Location: SKYLA; Positi

on: Sitting 

[facility]

 

             BP Diastolic 2018-06-15 13:24:00 71 mm[Hg]    Location: SKYLA; Positi

on: Sitting 

[facility]

 

             Height       2018-06-15 13:24:00 60 [in_us]                [facility]

 

             Weight       2018-06-15 13:24:00 159.1875 [lb_av]              [facility]

 

             Body Mass Index Calculated 2018-06-15 13:24:00 31.09 kg/m2         

      [facility]

 

             Temperature  2018-06-15 13:24:00 97 [degF]    Method: Temporal [facility]

 

             Heart Rate   2018-06-15 13:24:00 72 /min      Location: L Brachial 

Artery;  [facility]

 

             Respiration Rate 2018-06-15 13:24:00 16 /min      Quality: Normal [facility]

[facility] [place]

 [facility]

 

             BP Systolic  2018 11:31:00 151 mm[Hg]   Location: SKYLA; Positi

on: Sitting 

[facility]

 

             BP Diastolic 2018 11:31:00 81 mm[Hg]    Location: SKYLA; Positi

on: Sitting 

[facility]

 

             Height       2018 11:31:00 60 [in_us]                [facility]

 

             Weight       2018 11:31:00 161.1875 [lb_av]              Univ

[facility]

 

             Body Mass Index Calculated 2018 11:31:00 31.48 kg/m2         

      [facility]

 

             Heart Rate   2018 11:31:00 65 /min                   [facility]

 

             BP Systolic  2018 11:52:00 136 mm[Hg]   Location: SKYLA; Positi

on: Sitting 

[facility]

 

             BP Diastolic 2018 11:52:00 76 mm[Hg]    Location: SKYLA; Positi

on: Sitting 

[facility]

 

             Height       2018 11:52:00 60 [in_us]                [facility]

 

             Weight       2018 11:52:00 169.375 [lb_av]              Unive

[facility]

 

             Body Mass Index Calculated 2018 11:52:00 33.08 kg/m2         

      [facility]

 

             Heart Rate   2018 11:52:00 80 /min                   [facility]

 

             BP Systolic  2018 10:31:00 152 mm[Hg]   Location: SKYLA; Positi

on: Sitting 

[facility]

 

             BP Diastolic 2018 10:31:00 82 mm[Hg]    Location: SKYLA; [place]

on: Sitting 

[facility]

 

             Height       2018 10:31:00 60 [in_us]                [facility]

 

             Weight       2018 10:31:00 171.375 [lb_av]              Unive

[facility]

 

             Body Mass Index Calculated 2018 10:31:00 33.47 kg/m2         

      [facility]

 [facility]

 

             Temperature  2018 10:31:00 97.7 [degF]  Method: Temporal Univ

[facility]

 [facility]

 

             Heart Rate   2018 10:31:00 89 /min      Location: L Brachial 

Artery;  [facility]

 

             Respiration Rate 2018 10:31:00 16 /min      Quality: Normal U

[facility]

 [facility]







Procedures





                Procedure       Date / Time Performed Performing Clinician Sourc

e

 

                [QL] CMP W/EGFR 2020 00:00:00                 Graham o

[facility]

 

                [QL] LIPID PANEL 2020 00:00:00                 [facility]

 

                [QL] VITAMIN D, 25-HYDROXY, LC/MS/MS 2020 00:00:00        

         [facility] 

[facility]

 

                [QL] CMP W/EGFR 2020 00:00:00                 [facility] [place] [facility]

 

                [QL] LIPID PANEL 2020 00:00:00                 [facility]

 

                [QL] MICROALBUMIN, RANDOM URINE (W/CREATININE) 2020 00:00:

00                 [facility]

 

                [QL] CBC (INCLUDES DIFF/PLT) 2020 00:00:00                

 [facility] 

[facility]

 

                [QL] VITAMIN D, 25-HYDROXY, LC/MS/MS 2020 00:00:00        

         [facility] 

[facility]

 

                [QL] TSH, 3RD GENERATION W/REFLEX TO FT4 2020 00:00:00    

             [facility]

 

                          AMB REFERRAL TO  WEIGHT MANAGEMENT - MEDICAL NUTRITI

ON THERAPY 2020 

12:56:31                  Lucero Stanford

 

                POC GLUCOSE     2020 11:22:00 Lucero Stanford

 

                SURGICAL PATHOLOGY REQUEST 2020 09:23:00 Lucero Stanford

 

                MT AN ELECTIVE ENDOTRACHEAL AIRWAY 2020 08:55:14 LANIE Emmanuel

 

                REMOVAL, GASTRIC BAND, LAPAROSCOPIC 2020 08:17:00 Lucero Stanford

 

                POC GLUCOSE     2020 07:32:00 Lucero Stanford

 

                ECG 12-LEAD     2020 14:11:13 Lucero Stanford

 

                HC COMPLETE BLD COUNT W/AUTO DIFF 2020 14:00:00 Lucero Stanford

 

                HEMOGLOBIN A1C  2020 14:00:00 Lucero Stanford

 

                TYPE AND SCREEN 2020 13:56:00 Lucero Stanford

 

                BASIC METABOLIC PANEL 2020 12:59:00 Lucero Stanford

 

                ESTIMATED GFR   2020 12:59:00 Lucero Stanford

 

                XR CALCANEUS 2+ VW RIGHT 2019 10:07:53 Javad Vásquez

 

                [QL] MICROALBUMIN, RANDOM URINE (W/CREATININE) 2019 00:00

:00                 [facility]

 

                ZZFL UGI W KUB  2019 07:35:00 Lucero Stanford

 

                History of Achilles tendon surgery 2019 00:00:00          

       [facility] 

[facility]

 

                History of Ostectomy of calcaneus for spur 2019 00:00:00  

               [facility]

 

                [QL] URINALYSIS, COMPLETE W/REFLEX TO CULTURE 2019 00:00:

00                 [facility]

 

                [O] Flu Test (in Office ) 2018 00:00:00                 [facility]

 

                [[facility]] CMP W/EGFR 2018 00:00:00                 [facility]

 

                [QL] VITAMIN D, 25-HYDROXY, LC/MS/MS 2018 00:00:00       

          [facility] 

[facility]

 

                [[facility]] LIPID PANEL 2018 00:00:00                 [facility]

 

                [QL] CMP W/EGFR 2018 00:00:00                 [facility]

 

                [QL] MICROALBUMIN, RANDOM URINE (W/CREATININE) 2018 00:00

:00                 [facility]

 [facility]

 

                [QL] CMP W/EGFR 2018 00:00:00                 [facility]

 

                [QL] MICROALBUMIN, RANDOM URINE (W/CREATININE) 2018 00:00

:00                 [facility]

 

                [[facility]] LIPID PANEL 2018 00:00:00                 [facility]

 

                [QL] VITAMIN D, 25-HYDROXY, LC/MS/MS 2017-10-17 00:00:00       

          [facility] 

[facility]

 

                [QL] CALCIUM, IONIZED 2017-10-17 00:00:00                 Unive

[facility]

 

                History of Laparosc Restrictive Proc Adjustable Gastric Band Adele

cement                                 

[facility]

 

                History of  Section                                 [facility]

[facility]

 

                History of Bladder Cystectomy                                 [facility]

[facility]

 

                History of Breast Surgery Removal Of Mammary Implant Bilateral  

                               [facility]

 

                History of Cystoscopy With Removal Of Ureteral Calculus         

                        [facility] 

[facility]

 

                History of Neuroplasty Median Nerve At Carpal Tunnel            

                     [facility] 

[facility]

 

                History of Complete Colonoscopy                                 

[facility]

 

                History of Renal Lithotripsy                                 [facility]

[facility]

 

                History of Breast Surgery Enlargement Procedure                 

                [facility]

 

                History of Breast Surgery Reduction Procedure                   

              [facility]

 

                History of Abdominoplasty                                 Univer

[facility]

 

                History of Exchange Of Intraocular Lens                         

        [facility]

 

                History of Cath Placement Of Stent 1                            

     [facility]

 

                History of Laparoscopic adjustable gastric banding              

                   [facility] 

[facility]







Plan of Care





             Planned Activity Planned Date Details      Comments     Source

 

                    Future Scheduled Test 2020 00:00:00 [QL] CMP W/EGFR [c

ode = [QL] CMP 

W/EGFR]                                             [facility]

 

                    Future Scheduled Test 2020 00:00:00 [QL] LIPID PANEL [

code = [QL] LIPID 

PANEL]                                              [facility]

 

                    Future Scheduled Test 2020 00:00:00 [QL] VITAMIN D, 25

-HYDROXY, LC/MS/MS 

[code = [QL] VITAMIN D, 25-HYDROXY, LC/MS/MS]                           [facility]

 

                    Future Scheduled Test 2020 00:00:00 INFLUENZA VACCINE 

[code = INFLUENZA 

VACCINE]                                            [facility]

 

                    Diagnostic Test Pending 2019 00:00:00 [QLH] CMP W/EGFR

 [code = [QLH] CMP 

W/EGFR]                                             [facility]

ns

 

                    Diagnostic Test Pending 2019 00:00:00 [QLH] VITAMIN D,

 25-HYDROXY, 

LC/MS/MS [code = [QLH] VITAMIN D, 25-HYDROXY, LC/MS/MS]                         

  [facility] 

[facility]

 

                    Diagnostic Test Pending 2018-10-22 00:00:00 [QLH] LIPID PANE

L [code = [QLH] 

LIPID PANEL]                                        [facility]

ns

 

                    Diagnostic Test Pending 2018-10-22 00:00:00 [QLH] CMP W/EGFR

 [code = [QLH] CMP 

W/EGFR]                                             [facility]

 

                    Diagnostic Test Pending 2018-10-22 00:00:00 [QLH] MICROALBUM

IN, RANDOM URINE 

(W/CREATININE) [code = 62625]                           [facility]

[facility]

 

                    Diagnostic Test Pending 2018-10-22 00:00:00 [QLH] LIPID PANE

L [code = [QLH] 

LIPID PANEL]                                        [facility]

ns

 

                    Diagnostic Test Pending 2018-10-22 00:00:00 [QLH] CMP W/EGFR

 [code = [QLH] CMP 

W/EGFR]                                             [facility]

ns

 

                    Diagnostic Test Pending 2018-10-22 00:00:00 [QLH] MICROALBUM

IN, RANDOM URINE 

(W/CREATININE) [code = 09248]                           [facility]

[facility]

 

                    Future Scheduled Test 2016 00:00:00 65+ PNEUMOCOCCAL V

ACCINE (1 of 1 - 

PPSV23) [code = 65+ PNEUMOCOCCAL VACCINE (1 of 1 - PPSV23)]                     

      [facility] Scheduled Test 2001 00:00:00 BREAST CANCER SCRE

ENING [code = BREAST

 CANCER SCREENING]                                  [facility] Scheduled Test 2001 00:00:00 COLONOSCOPY SCREEN

ING [code = 

COLONOSCOPY SCREENING]                              [facility] Scheduled Test 2001 00:00:00 SHINGLES VACCINES 

(#1) [code = 

SHINGLES VACCINES (#1)]                             [facility] Scheduled Test 1961 00:00:00 DIABETIC FOOT EXAM

 [code = DIABETIC 

FOOT EXAM]                                          [facility] Scheduled Test 1961 00:00:00 URINE MICROALBUMIN

 [code = URINE 

MICROALBUMIN]                                       [facility] Scheduled Test 1951 00:00:00 DIABETIC RETINAL E

YE EXAM [code = 

DIABETIC RETINAL EYE EXAM]                           [facility] Appointment 2020-10-21 13:30:00 NADIA DAS,         

      [facility]







Encounters





             Start Date/Time End Date/Time Encounter Type Admission Type Attendi

[facility]   Care Department Encounter ID    Source

 

             2020 10:45:00 2020 10:45:00 Appointment; ESTEVAN MARIEE P.A. CAMPOS, BERTHA, P.A. [facility], [address] [id number]

1        [facility]

 

             2020 11:30:2020 11:30:00 Appointment; THIAGO LIRIANO M.D. NASSIF, JULIA, M.D. [facility], [address] [id number] 

       [facility]

 

             2020 10:00:00 2020 10:00:00 Appointment; ESTEVAN MARIEE P.A. CAMPOS, BERTHA, P.A. [facility] [id number]        

[facility]

[facility]

 

        2020 00:00:2020 00:00:00 Outpatient         DANIEL STANFORD [facility]     

[id number]                           [facility]

 

             2020 10:00:00 2020 10:00:00 Appointment; THIAGO LIRIANO M.D. NASSIF, JULIA, M.D. [facility], [address] [id number] 

       [facility]

 

             2020 10:15:2020 10:15:00 Appointment; ESTEVAN MARIEE P.A. CAMPOS, BERTHA, P.A. [facility], [address] [id number]

0        [facility]

[facility]

 

        2020 00:00:00 2020 00:00:00 Outpatient         DANIEL STANFORD

[facility]     [address]     

[id number]                           [facility]

 

             2019 10:00:00 2019 10:00:00 Appointment; THIAGO LIRIANO M.D. NASSIF, JULIA, M.D. [facility], [address] [id number] 

       [facility]

 

        2019 00:00:00 2019 00:00:00 Outpatient         STANFORDDANIEL ARCE [facility]     

[id number]                           [facility]

 

             2019 09:30:00 2019 09:30:00 Appointment; THIAGO LIRIANO M.D. NASSIF, JULIA, M.D. [facility], [address] [id number] 

       [facility]

 

             2019 09:30:00 2019 09:30:00 Appointment; THIAGO LIRIANO M.D. NASSIF, JULIA, M.D. [facility] [address] [id number]    

    [facility]

 

             2019 10:45:00 2019 10:45:00 Appointment; TRACIE UNDERWOOD AP RN TRAN, THUY, APRN      [facility] [id number]        [facility]

 

             2018 09:30:00 2018 09:30:00 Appointment; DIO, LION, A

PRN               DIO, 

LION, APRN     [facility] [id number]        [facility]

 

             2018 10:00:00 2018 10:00:00 Appointment; THIAGO LIRIANO M.D. NASSIF, JULIA, M.D. [facility] [address] [id number]    

    [facility]

 

             2018 13:00:00 2018 13:00:00 Appointment; THIAGO LIRIANO M.D. NASSIF, JULIA, M.D. [facility] [address] [id number]    

    [facility]

 

                2018-06-15 13:30:00 2018-06-15 13:30:00 Appointment; CYNTHIA GARLAND NP TEJADA-FOSTER, NORMA, NP [facility] [phone]

[phone]     [facility]

 

             2018 11:30:00 2018 11:30:00 Appointment; THIAGO LIRIANO M.D. NASSIF, JULIA, M.D. [facility] [address] [id number]    

    [facility]

 

             2018 11:30:00 2018 11:30:00 Appointment; THIAGO LIRIANO M.D. NASSIF, JULIA, M.D. [facility]             [id number]        [facility]

 

             2018 10:30:00 2018 10:30:00 Appointment; ESTEVAN MARIEE P.A. CAMPOS, BERTHA, P.A. [facility] [id number]        [facility] 

[facility]

 

             2017 08:00:00 2017 08:00:00 Appointment; ESTEVAN MARIEE P.A. CAMPOS, BERTHA, P.A. [facility]             [facility] [id number]        [facility] 

[facility]

 

             2017-10-18 10:00:00 2017-10-18 10:00:00 Appointment; THIAGO LIRIANO M.D. NASSIF, JULIA, M.D. [facility] [phone]        [facility] 

[facility]

 

             2017 13:30:00 2017 13:30:00 Appointment; AMISHA KIMBALL P.A. SPOONER, JOSEPH, P.A. [facility]             [facility]             [id number]        [facility]

 

                    2017 08:45:00 2017 08:45:00 Appointment; LUCAS XAVIER M.D. LI-YUNG HING, ANDREW, M.D. [facility] [[email or web address]]        [facility]        [id number] 

  [facility]

 

             2017 11:00:00 2017 11:00:00 Appointment; AMISHA KIMBALL P.A. SPOONER, JOSEPH, P.A. [facility]             [facility]             [id number]        [facility]

 

                    2017 08:00:00 2017 08:00:00 Appointment; LUCAS XAVIER M.D. LI-YUNG HING, ANDREW, M.D. [facility] [[email or web address]]        [facility]        [id number] 

  [facility]

 

             2017 10:00:00 2017 10:00:00 Appointment; AMISHA KIMBALL P.A. SPOONER, JOSEPH, P.A. [facility]             [facility]             [id number]        [facility]

 

             2017 11:00:00 2017 11:00:00 Appointment; AMISHA KIMBALL P.A. SPOONER, JOSEPH PIDRIS RAE             [facility]             [id number]        [facility]

 

             2017 11:15:00 2017 11:15:00 Appointment; ESTEVAN MARIEE P.A. CAMPOS, BERTHA, P.A. [facility]             [facility]             [id number]        [facility]

 

             2017 11:30:00 2017 11:30:00 Appointment; THIAGO LIRIANO M.D. NASSIF, JULIA, M.D. [facility]             [facility]             [id number]        [facility]

 

             2017 12:45:00 2017 12:45:00 Appointment; ESTEVAN MARIEE P.A. CAMPOS, BERTHA, PJuveAJuve [facility]TP             [facility]             [id number]        [facility]

 

             2017 11:15:00 2017 11:15:00 Appointment; ESTEVAN MARIEE P.A. CAMPOS, BERTHA, P.A. [facility]             [facility]             [id number]        [facility]

 

             2017 14:30:00 2017 14:30:00 Appointment; THIAGO LIRIANO M.D. NASSIF, JULIA, M.D. [facility]             [facility]             [id number]        [facility]

 

             2016 10:30:00 2016 10:30:00 Appointment; DANAE ZAVALA NP HOANG, CHRISTINA, NP [facility]             [facility]             [id number]        [facility]

 

             2016 08:30:00 2016 08:30:00 Appointment; THIAGO LIRIANO M.D. NASSIF, JULIA, M.D. [facility]             [facility]             [id number]        [facility]

 

             2016 10:30:00 2016 10:30:00 Appointment; ESTEVAN MARIEE P.A. CAMPOS, BERTHA, P.A. [facility]             [facility]             [id number]        [facility]

 

             2016 10:15:00 2016 10:15:00 Appointment; TRACIE UNDERWOOD NP TRAN, THUY, NP        [facility]             [facility]             [id number]        [facility]

 

             2016 09:15:00 2016 09:15:00 Appointment; ESTEVAN MARIEE P.A. CAMPOS, BERTHA, P.A. [facility]             [facility]             [id number]        [facility]

 

             2016 08:00:00 2016 08:00:00 Appointment; THIAGO LIRIANO M.D. NASSIF, JULIA M.D. [facility]             [facility]             [id number]        [facility]

 

             2016 09:30:00 2016 09:30:00 Appointment; ESTEVAN MARIEE P.A. CAMPOS, BERTHA, P.A. [facility]             [facility]             [id number]        [facility]

 

             2016 09:15:00 2016 09:15:00 Appointment; ESTEVAN MARIEE P.A. CAMPOS, BERTHA, P.A. [facility]             [facility]             [id number]        [facility]

 

             2016 10:15:00 2016 10:15:00 Appointment; ESTEVAN MARIEE P.A. CAMPOS, BERTHA, P.A. [facility]             [facility]             [id number]        [facility]

 

                2016 11:00:00 2016 11:00:00 Appointment; MARIO JEAN M.D. SHACKELFORD, JAMES, M.D. [facility]          [facility]          [id number]     [facility]

 

             2016 08:00:00 2016 08:00:00 Appointment; THIAGO LIRIANO M.D. NASSIF, JULIA, M.D. [facility]             [facility]             [id number]        [facility]

 

             2016-02-15 10:30:00 2016-02-15 10:30:00 Appointment; ESTEVAN MARIEE P.A. CAMPOS, BERTHA, P.A. [facility]             [facility]             [id number]        [facility]

 

             2016 13:45:00 2016 13:45:00 Appointment; PENNY HE P.A. CRUZ, LETICIA PSAMUEL. [facility]TP             [facility]             [phone]        [facility]

 

             2015 13:00:00 2015 13:00:00 Appointment; SIMON CARR N P BECK, SHERI, NP       UTP             [facility]P             [phone]        [facility]







Results





           Test Description Test Time  Test Comments Results    Result Comments 

Source

 

                CHEST SINGLE (PORTABLE) 2020 20:41:00                     

                              

                                                   [place] [address]      Patient Name: SUPRIYA MOULTON                                
MR #: [id number]                  : 1950                               
   Age/Sex: 69/F  Acct #: [id number]                              Req #: [id number]
[id number]  [facility] Physician:                                                      
Ordered by: TREVER NUÑEZ MD                         Report #: [id number]
       Location: ER                                      Room/Bed:              
    
________________________________________________________________________________

___________________    Procedure: [id number] DX/CHEST SINGLE (PORTABLE)  Exam 
Date:                             Exam Time:                                    
          REPORT STATUS: Signed    EXAMINATION:  CHEST SINGLE (PORTABLE)        
 INDICATION:          chest pain    Y      COMPARISON:  None available.         
 FINDINGS:  AP view         TUBES and LINES:  None.      LUNGS:  Lungs are well 
inflated.  There is no evidence of pneumonia or   pulmonary edema.      PLEURA: 
No pleural effusion or pneumothorax.      HEART AND MEDIASTINUM:  The 
cardiomediastinal silhouette is unremarkable.          BONES AND SOFT TISSUES:  
No acute osseous lesion.  Soft tissues are   unremarkable.      UPPER ABDOMEN: 
No free air under the diaphragm.          IMPRESSION:    No acute thoracic 
abnormality.         Signed by: Dr. Tomi Khan MD on 2020 8:41 PM      
 Dictated By: TOMI KHAN MD  Electronically Signed By: TOMI KHAN MD on 
20  Transcribed By: MARY on 20       COPY TO:   
TREVER NUÑEZ MD                                     

 

                    [QL] LIPID PANEL    2020 08:18:00   

 

                                        Test Item

 

             CHOLESTEROL, TOTAL; Above High Threshold (test code = 2093-3) 391 m

g/dl    <200                       

 

             HDL CHOLESTEROL; Below Low Threshold (test code = 2085-9) 39 mg/dl 

    > OR = 50                  

 

             TRIGLYCERIDES; Above High Threshold (test code = 2571-8) 1509 mg/dl

   <150                      

Results verified by repeat analysis on dilution.  If a non-fasting specimen was 
collected, considerrepeat triglyceride testing on a fasting specimenif 
clinically indicated. Sonya et al. J. of Clin. Lipidol. 2015;9:129-169.  
There is increased risk of pancreatitis when the triglyceride concentration is 
very high (> or = 500 mg/dL, especially if > or = 1000 mg/dL). Sonya et al. 
J. of Clin. Lipidol. 2015;9:129-169.

 

             LDL-CHOLESTEROL (test code = 12907-7) See Comment                  

          LDL cholesterol not 

calculated. Triglyceride levelsgreater than 400 mg/dL invalidate calculated LDL 
results. Reference range: <100 Desirable range <100 mg/dL for primary 
prevention;  <70 mg/dL for patients with CHD or diabetic patients with > or = 2 
CHD risk factors. LDL-C is now calculated using the Lefty-Sun calculation, 
which is a validated novel method providing better accuracy than the Friedewald 
equation in the estimation of LDL-C. Lefty SS et al. LISETTE. 2013;310(19): 2061-
2068 (http://education.Mirimus/faq/LEV672)

 

             CHOL/HDLC RATIO (test code = CHOL/HDLC RATIO) 10.0 {CALC}  <5.0    

                   

 

             NON HDL CHOLESTEROL (test code = NON HDL CHOLESTEROL) 352 {MG/DL  C

AL} <130                      

Non-HDL level > or = 220 is very high and may indicate genetic familial 
hypercholesterolemia (FH). Clinical assessment and measurement of blood lipid 
levels should be considered for all first-degree relatives of patients with an 
FH diagnosis. For patients with diabetes plus 1 major ASCVD risk factor, 
treating to a non-HDL-C goal of <100 mg/dL (LDL-C of <70 mg/dL) is considered a 
therapeutic option.





[facility][QL] CMP W/[id number] 08:18:00* 



             Test Item    Value        Reference Range Interpretation Comments

 

             GLUCOSE; Above High Threshold (test code = 1547-9) 118 mg/dl    65-

99                     Fasting 

reference interval For someone without known diabetes, a glucose valuebetween 
100 and 125 mg/dL is consistent withprediabetes and should be confirmed with 
afollow-up test.

 

             UREA NITROGEN (BUN) (test code = UREA NITROGEN (BUN)) 38 mg/dl     

7-25                       

 

             CREATININE (test code = CREATININE) 1.11 mg/dl   0.50-0.99         

        For patients >49 

years of age, the reference limitfor Creatinine is approximately 13% higher for 
peopleidentified as -American.

 

                    eGFR NON-AFR. AMERICAN (test code = eGFR NON-AFR. AMERICAN) 

51 {ML/MIN/1.7}     > OR

 = 60                                                

 

                    eGFR  (test code = eGFR ) 59

 {ML/MIN/1.7}     > OR =

 60                                                  

 

             BUN/CREATININE RATIO (test code = BUN/CREATININE RATIO) 34 {CALC}  

  6-22                       

 

             SODIUM (test code = SODIUM) 138 mmol/L   135-146      N            

 

 

             POTASSIUM (test code = POTASSIUM) 4.5 mmol/L   3.5-5.3      N      

       

 

             CHLORIDE (test code = CHLORIDE) 106 mmol/L          N        

     

 

             CARBON DIOXIDE (test code = CARBON DIOXIDE) 22 mmol/L    20-32     

   N             

 

             CALCIUM (test code = CALCIUM) 9.9 mg/dl    8.6-10.4     N          

   

 

             PROTEIN, TOTAL (test code = PROTEIN, TOTAL) 6.6 g/dl     6.1-8.1   

   N             

 

             ALBUMIN (test code = ALBUMIN) 4.0 g/dl     3.6-5.1      N          

   

 

             GLOBULIN (test code = GLOBULIN) 2.6 {G/DL  CALC} 1.9-3.7      N    

         

 

                ALBUMIN/GLOBULIN RATIO (test code = ALBUMIN/GLOBULIN RATIO) 1.5 

{CALC}      1.0-2.5         N

                                         

 

             BILIRUBIN, TOTAL; Normal (test code = 42493-4) 0.5 mg/dl    0.2-1.2

      N             

 

             ALKALINE PHOSPHATASE (test code = ALKALINE PHOSPHATASE) 67 u/l     

         N             

 

             AST; Normal (test code = 1916-6) 14 u/l       10-35        N       

      

 

             ALT; Normal (test code = 1742-6) 9 u/l        6-29         N       

      





[facility][QL] VITAMIN D, 25-HYDROXY, LC/MS/[id number] 
08:18:00* 



             Test Item    Value        Reference Range Interpretation Comments

 

                VITAMIN D,25-OH,TOTAL,IA (test code = VITAMIN D,25-OH,TOTAL,IA) 

21 ng/ml                  

                                        Vitamin D Status         25-OH Vitamin D

: Deficiency:                    <20 

ng/mLInsufficiency:             20 - 29 ng/mLOptimal:                 > or = 30 
ng/mL For 25-OH Vitamin D testing on patients on D2-supplementation and patients
 for whom quantitation of D2 and D3 fractions is required, the 
[facility]()25-OH VIT D, (D2,D3), LC/MS/MS is recommended: order code 19952 
(patients >2yrs).See Note 1 Note 1 For additional information, please refer to 
http://education.Mirimus/faq/SAY100 (This link is being provided 
for informational/educational purposes only.)





[facility][] LIPID [id number] 08:23:00* 



             Test Item    Value        Reference Range Interpretation Comments

 

             CHOLESTEROL, TOTAL; Above High Threshold (test code = 2093-3) 332 m

g/dl    <200                       

 

             HDL CHOLESTEROL; Below Low Threshold (test code = 5-9) 42 mg/dl 

    > OR = 50                  

 

             TRIGLYCERIDES; Above High Threshold (test code = 2571-8) 1131 mg/dl

   <150                      

Results verified by repeat analysis on dilution.  If a non-fasting specimen was 
collected, considerrepeat triglyceride testing on a fasting specimenif 
clinically indicated. Sonya et al. J. of Clin. Lipidol. 2015;9:129-169.  
There is increased risk of pancreatitis when the triglyceride concentration is 
very high (> or = 500 mg/dL, especially if > or = 1000 mg/dL). Hassan et al. 
J. of Clin. Lipidol. 2015;9:129-169.

 

             LDL-CHOLESTEROL (test code = 41330-3) See Comment                  

          LDL cholesterol not 

calculated. Triglyceride levelsgreater than 400 mg/dL invalidate calculated LDL 
results. Reference range: <100 Desirable range <100 mg/dL for primary 
prevention;  <70 mg/dL for patients with CHD or diabetic patients with > or = 2 
CHD risk factors. LDL-C is now calculated using the Lefty-Sun calculation, 
which is a validated novel method providing better accuracy than the Friedewald 
equation in the estimation of LDL-C. Lefty HARMON et al. LISETTE. 2013;310(19): 2061-
2068 (http://education.Mirimus/faq/JLP108)

 

             CHOL/HDLC RATIO (test code = CHOL/HDLC RATIO) 7.9 {CALC}   <5.0    

                   

 

             NON HDL CHOLESTEROL (test code = NON HDL CHOLESTEROL) 290 {MG/DL  C

AL} <130                      

Non-HDL level > or = 220 is very high and may indicate genetic familial 
hypercholesterolemia (FH). Clinical assessment and measurement of blood lipid 
levels should be considered for all first-degree relatives of patients with an 
FH diagnosis. For patients with diabetes plus 1 major ASCVD risk factor, 
treating to a non-HDL-C goal of <100 mg/dL (LDL-C of <70 mg/dL) is considered a 
therapeutic option.





[facility][QL] MICROALBUMIN, RANDOM URINE (W/CREATININE)
2020 08:23:00* 



             Test Item    Value        Reference Range Interpretation Comments

 

                    CREATININE, RANDOM URINE (test code = CREATININE, RANDOM URI

NE) 112 mg/dl           

                          N                          

 

             MICROALBUMIN (test code = MICROALBUMIN) 41.8 mg/dl                N

            Results verified by 

repeat analysis on dilution. Reference RangeNot established

 

                                        MICROALBUMIN/CREATININE RATIO, RANDOM UR

INE (test code = MICROALBUMIN/CREATININE

 RATIO, RANDOM URINE) 373 {MCG/MG CRE} <30                             The ADA d

efines abnormalities in 

albuminexcretion as follows: Category         Result (mcg/mg creatinine) Normal 
                   <30Microalbuminuria          Clinical albuminuria   > 
OR = 300 The ADA recommends that at least two of threespecimens collected within
 a 3-6 month period beabnormal before considering a patient to bewithin a 
diagnostic category.





[facility][QL] CMP W/[id number] 08:23:00* 



             Test Item    Value        Reference Range Interpretation Comments

 

             GLUCOSE; Above High Threshold (test code = 1547-9) 182 mg/dl    65-

99                     Fasting 

reference interval For someone without known diabetes, a glucosevalue >125 mg/dL
 indicates that they may havediabetes and this should be confirmed with afollow-
up test.

 

             UREA NITROGEN (BUN) (test code = UREA NITROGEN (BUN)) 50 mg/dl     

7-25                       

 

             CREATININE (test code = CREATININE) 1.19 mg/dl   0.50-0.99         

        For patients >49 

years of age, the reference limitfor Creatinine is approximately 13% higher for 
peopleidentified as -American.

 

                    eGFR NON-AFR. AMERICAN (test code = eGFR NON-AFR. AMERICAN) 

47 {ML/MIN/1.7}     > OR

 = 60                                                

 

                    eGFR  (test code = eGFR ) 54

 {ML/MIN/1.7}     > OR =

 60                                                  

 

             BUN/CREATININE RATIO (test code = BUN/CREATININE RATIO) 42 {CALC}  

  6-22                       

 

             SODIUM (test code = SODIUM) 137 mmol/L   135-146      N            

 

 

             POTASSIUM (test code = POTASSIUM) 5.0 mmol/L   3.5-5.3      N      

       

 

             CHLORIDE (test code = CHLORIDE) 106 mmol/L          N        

     

 

             CARBON DIOXIDE (test code = CARBON DIOXIDE) 20 mmol/L    20-32     

   N             

 

             CALCIUM (test code = CALCIUM) 10.3 mg/dl   8.6-10.4     N          

   

 

             PROTEIN, TOTAL (test code = PROTEIN, TOTAL) 7.0 g/dl     6.1-8.1   

   N             

 

             ALBUMIN (test code = ALBUMIN) 4.4 g/dl     3.6-5.1      N          

   

 

             GLOBULIN (test code = GLOBULIN) 2.6 {G/DL  CALC} 1.9-3.7      N    

         

 

                ALBUMIN/GLOBULIN RATIO (test code = ALBUMIN/GLOBULIN RATIO) 1.7 

{CALC}      1.0-2.5         N

                                         

 

             BILIRUBIN, TOTAL; Normal (test code = 75996-0) 0.4 mg/dl    0.2-1.2

      N             

 

             ALKALINE PHOSPHATASE (test code = ALKALINE PHOSPHATASE) 81 u/l     

         N             

 

             AST; Normal (test code = 1916-6) 14 u/l       10-35        N       

      

 

             ALT; Normal (test code = 1742-6) 7 u/l        6-29         N       

      





[facility][QL] CBC (INCLUDES DIFF/PLT)2020 08:23:00* 



             Test Item    Value        Reference Range Interpretation Comments

 

                    WHITE BLOOD CELL COUNT (test code = WHITE BLOOD CELL COUNT) 

4.6 {Thousand/u}    

3.8-10.8                  N                          

 

                    RED BLOOD CELL COUNT (test code = RED BLOOD CELL COUNT) 4.12

 {Million/uL}   

3.80-5.10                 N                          

 

             HEMOGLOBIN; Normal (test code = 20563-3) 12.1 g/dl    11.7-15.5    

N             

 

             HEMATOCRIT; Normal (test code = 4544-3) 36.3 %       35.0-45.0    N

             

 

             MCV; Normal (test code = 787-2) 88.1 fL      80.0-100.0   N        

     

 

             MCHC; Normal (test code = 54410-6) 33.3 g/dl    32.0-36.0    N     

        

 

             RDW; Normal (test code = 788-0) 13.7 %       11.0-15.0    N        

     

 

             PLATELET COUNT; Normal (test code = 777-3) 213 {Thousand/u} 140-400

      N             

 

             MPV; Normal (test code = 23517-2) 11.5 fL      7.5-12.5     N      

       

 

                    ABSOLUTE NEUTROPHILS (test code = ABSOLUTE NEUTROPHILS) 2972

 {cells/uL}     

3379-0881                 N                          

 

                    ABSOLUTE LYMPHOCYTES (test code = ABSOLUTE LYMPHOCYTES) 1003

 {cells/uL}     850-3900

                          N                          

 

             ABSOLUTE MONOCYTES (test code = ABSOLUTE MONOCYTES) 465 {cells/uL} 

200-950      N             

 

             ABSOLUTE EOSINOPHILS (test code = ABSOLUTE EOSINOPHILS) 120 {cells/

uL}        N            

 

 

             ABSOLUTE BASOPHILS (test code = ABSOLUTE BASOPHILS) 41 {cells/uL} 0

-200        N             

 

             NEUTROPHILS (test code = NEUTROPHILS) 64.6 %                    N  

           

 

             LYMPHOCYTES (test code = LYMPHOCYTES) 21.8 %                    N  

           

 

             MONOCYTES; Normal (test code = 26485-3) 10.1 %                    N

             

 

             EOSINOPHILS; Normal (test code = 71205-4) 2.6 %                    

 N             

 

             BASOPHILS; Normal (test code = 88298-5) 0.9 %                     N

             





[facility][QL] TSH, 3RD GENERATION W/REFLEX TO [id number]
 08:23:00* 



             Test Item    Value        Reference Range Interpretation Comments

 

                                        TSH, 3RD GENERATION W/REFLEX TO FT4 (miah

t code = TSH, 3RD GENERATION W/REFLEX TO

 FT4)           3.08 {MIU/L}    0.40-4.50       N                





[facility][QL] VITAMIN D, 25-HYDROXY, LC/MS/[id number] 
08:23:00* 



             Test Item    Value        Reference Range Interpretation Comments

 

                VITAMIN D,25-OH,TOTAL,IA (test code = VITAMIN D,25-OH,TOTAL,IA) 

16 ng/ml                  

                                        Vitamin D Status         25-OH Vitamin D

: Deficiency:                    <20 

ng/mLInsufficiency:             20 - 29 ng/mLOptimal:                 > or = 30 
ng/mL For 25-OH Vitamin D testing on patients on D2-supplementation and patients
 for whom quantitation of D2 and D3 fractions is required, the 
QuestAssureD(TM)25-OH VIT D, (D2,D3), LC/MS/MS is recommended: order code 93042 
(patients >2yrs).See Note 1 Note 1 For additional information, please refer to 
http://[email or web address].com/faq/WOL656 (This link is being provided 
for informational/educational purposes only.)





[facility] 



             Test Item    Value        Reference Range Interpretation Comments

 

             Glucose POC Lifescan (test code = Glucose POC Lifescan) 82         

                             





[facility][O] Hemoglobin A1c (in office)2020 11:25:00
  * 



             Test Item    Value        Reference Range Interpretation Comments

 

             HEMOGLOBIN A1c (test code = 4548-4) 7.3                            

         





[facility]2020 
10:12:00* 



             Test Item    Value        Reference Range Interpretation Comments

 

             Glucose POC Lifescan (test code = Glucose POC Lifescan) 127        

                             





[facility][O] Hemoglobin A1c (in office)2020 10:12:00
  * 



             Test Item    Value        Reference Range Interpretation Comments

 

             HEMOGLOBIN A1c (test code = 4548-4) 7.5                            

         





[facility]iciansS[facility] [id number] 16:27:54* 



             Test Item    Value        Reference Range Interpretation Comments

 

             Case number (test code = 2877450) [id number]                     

       

 

                    Surgical pathology report (test code = 2255) See link below 

for PDF Lab Report  

                                                     

 

             Result status (test code = 9934654) This is Final Report for [id number]

6705-3                            





[facility] [id number] 11:24:50* 



             Test Item    Value        Reference Range Interpretation Comments

 

             POC glucose (test code = 51965-5) 207 mg/dL    65-99        H      

       Name: Gambini AntonetteDevice ID: [id number] 

 

             Lab Interpretation (test code = 47344-8) Abnormal                  

              





[facility] [id number] 08:55:14Earnest Emmanuel     2020 
 8:55 AMAirwayPerformed by: Earnest EmmanuelAuthorized by: Atif Larson MD  Location:  ORUrgency:  ElectiveDifficult Airway: No  Anesthesiologist:  
Atif Larson MDResilenkat/CRNA/AA:  Earnest EmmanuelPerformed by:    
resident/CRNA/AAPreoxygenated with 100% O2: Yes  C-spine Precautions Maintained 
Throughout: Yes  Mask Ventilation:  Easy maskFinal Airway Type:  Endotracheal 
airwayFinal Endotracheal Airway:  ETTCuffed: Yes  Technique Used:  Direct 
laryngoscopyDevices/Methods Used in Placement:  Intubating styletInsertion Site:
  OralBlade Type:  MacintoshLaryngoscope Blade/Videolaryngoscope Blade Size:  
3ETT Size (mm):  7.0Cuff at minimum occlusion pressure: Yes  Measured from:  
LipsETT to Lips (cm):  21Placement Verified by: CO2 detection, direct 
visualization and equal breath sounds  Laryngoscopic view:  Grade IIb - view of 
arytenoids or posterior of glottis onlyRapid Sequence Induction (RSI): No  
Modified RSI: No  Number of Attempts at Approach:  1[facility] MethodistHemoglobin 
[id number] 15:27:10* 



             Test Item    Value        Reference Range Interpretation Comments

 

             Hemoglobin A1C (test code = 87080-9) 7.0 %        4-5.6        H   

         HbA1c cutoffs for diagnosing 

diabetes:4.0% - 5.6% = normal5.7% - 6.4% = increased risk for diabetes 
(prediabetes)9>=6.5% = diqgnpvp2Gukaf for glycemic control (ADA 2016)< 7.0%  
Target for nonpregnant adults with diabetes. More or less stringent targets may 
be appropriate for individual patients. <7.5%   Target for Children and 
adolescents with type 1 diabetes.  

 

             Lab Interpretation (test code = 24567-2) Abnormal                  

              





[place] MethodistType and [id number] 15:22:00* 



             Test Item    Value        Reference Range Interpretation Comments

 

             ABO grouping (test code = 883-9) O                                 

      

 

             Rh type (test code = 16868-7) NEG                                  

   

 

             Antibody screen (gel) (test code = 890-4) NEG                      

               





[place] MethodistBasic metabolic [id number] 14:53:06* 



             Test Item    Value        Reference Range Interpretation Comments

 

             Sodium (test code = 2951-2) 140          135- 148 mEq/L            

   

 

             Potassium (test code = 2823-3) 4.7          3.5- 5.0 mEq/L         

      

 

             Chloride (test code = 2075-0) 101          98- 112 mEq/L           

    

 

             CO2 (test code = -9) 23           24- 31 mEq/L L             

 

             Anion gap (test code = 33037-3) 16@ANIO      7- 15 mEq/L  H        

     

 

             BUN (test code = 3094-0) 31 mg/dL     8-23         H             

 

             Creatinine (test code = 2160-0) 1.00 mg/dL   0.5-0.9      H        

     

 

             Glucose (test code = 2345-7) 383 mg/dL    65-99        H           

  

 

             Calcium (test code = 11931-0) 10.8 mg/dL   8.8-10.2     H          

   

 

             Lab Interpretation (test code = 49765-3) Abnormal                  

              





[place] MethodistEstimated [id number] 14:53:05* 



             Test Item    Value        Reference Range Interpretation Comments

 

             Estimated GFR (test code = 5488) 58           mL/min/1.73 m2 A     

       Catergory  Units    

InterpretationG1         >=90     Normal or highG2         60-89    Mildly 
zrqivjikiD4t        45-59    Mildly to moderately ytysxiidlG0r        30-44    
Moderately to severely decreasedG4         15-29    Severely decreasedG5        
 <15      Kidney failureThe eGFR was calculated using the Chronic Kidney Disease
 Epidemiology Collaboration (CKD-EPI) equation. Interpretation is based on 
recommendations of the National Kidney Foundation-Kidney Disease Outcomes 
Quality Initiative (NKF-KDOQI) published in 2014. 

 

             Lab Interpretation (test code = 24686-5) Abnormal                  

              





[facility] MethodistECG 12 [id number] 14:40:59* 



             Test Item    Value        Reference Range Interpretation Comments

 

             Ventricular rate (test code = 253) 94                              

        

 

             Atrial rate (test code = 255) 94                                   

   

 

             MT interval (test code = 266) 198                                  

   

 

             QRSD interval (test code = 260) 94                                 

     

 

             QT interval (test code = 264) 380                                  

   

 

             QTC interval (test code = 265) 475                                 

    

 

             P axis 1 (test code = 267) 58                                      

 

             QRS axis 1 (test code = 268) 38                                    

  

 

             T wave axis (test code = 270) 85                                   

   

 

                          EKG impression (test code = 273) Normal sinus rhythm-S

eptal infarct (cited on or

 before 26-MAR-2019)-Abnormal ECG-In automated comparison with ECG of 
26-MAR-2019 11:59,-Vent. rate has increased BY  36 BPM-QT has 
lengthened-Electronically Signed By Christina Vasquez MD ([id number]) on 2020 2:40:55 
PM                                                           





[place] MethodistCBC with platelet and [id number] 14:37:08* 



             Test Item    Value        Reference Range Interpretation Comments

 

             WBC (test code = 52666-2) 5.23         4.50- 11.00 k/uL            

   

 

             RBC (test code = 80759-1) 4.21 m/uL    4.2-5.5                    

 

             HGB (test code = 718-7) 12.2 g/dL    12-16                      

 

             HCT (test code = 4544-3) 37.7 %       37-47                      

 

             MCV (test code = 787-2) 89.5 fL                           

 

             MCH (test code = 785-6) 29.0 pg      27-34                      

 

             MCHC (test code = 786-4) 32.4 g/dL    31-37                      

 

             RDW - SD (test code = 90845-5) 46.3 fL      37-55                  

    

 

             MPV (test code = 10739-4) 11.5 fL      8.8-13.2                   

 

             Platelet count (test code = 07543-1) 199          150- 400 k/uL    

           

 

             Nucleated RBC (test code = 56007-4) 0.00         /100 WBC          

         

 

             Neutrophils (test code = 36566-1) 72.8 %       39-69        H      

       

 

             Lymphocytes (test code = 83260-5) 15.3 %       25-45        L      

       

 

             Monocytes (test code = 26485-3) 8.4 %        0-10                  

     

 

             Eosinophils (test code = 12180-8) 2.3 %        0-5                 

       

 

             Basophils (test code = 95507-7) 0.8 %        0-1                   

     

 

             Lab Interpretation (test code = 47521-7) Abnormal                  

              





[facility][QLH] MICROALBUMIN, RANDOM URINE (W/CREATININE)2019 
11:10:01* 



             Test Item    Value        Reference Range Interpretation Comments

 

             Urine Microalbumin (test code = Urine Microalbumin) 235.0 mg/L     

                        No 

established reference range.

 

             U Creatinine (test code = 2161-8) 64.30 mg/dl                      

      No established reference 

range.

 

                                        Urine Microalbuming Creatinine Ratio; Ab

ove High Threshold (test code = 32630-3)

                365.5 mg/g      <=30.0                           





[facility][O] Hemoglobin A1c (in office)2019 10:15:00
  * 



             Test Item    Value        Reference Range Interpretation Comments

 

             HEMOGLOBIN A1c (test code = 4548-4) 7.3                            

         





[facility]Gl[facility])2019 
10:14:00* 



             Test Item    Value        Reference Range Interpretation Comments

 

             Glucose POC Lifescan (test code = Glucose POC Lifescan) 104        

                             





[facility] PhysiciansFL DARIO W [id number] 10:03:06Hm Interface, 
Radiology Results  - 2019 10:06 AM CSTEXAMINATION:  LATIA ELLISBCLINICAL HISTORY:  Z98.84 Bariatric surgery status, I10 Essential (primary) 
hypertension, Esophageal refluxCOMPARISON:  None.TECHNIQUE:  UPPER GI SERIES was
 performed with barium.FLUOROSCOPIC TIME:  1 minute 12 secondsTotal number of 
fluoroscopic images: 22IMPRESSION: radiograph of the abdomen demonstrates 
an indwelling gastric lap band. The Phi angle is 37 degrees. The subcutaneous 
port reservoir has been disconnected/removed.Esophagus demonstrates normal co
ntour, distention and motility.There is no hiatal hernia. There is no delay of c
ontrast passage across the lap band into the stomach, no significant narrowing/s
tricture of the stomach at the level of the band is seen. This suggests that the
 band reservoir is decompressed.Below the band, gastric contour and distention a
re normal. There is normal emptying into the duodenum.No gastroesophageal reflux
 was observed.[id number] MethodistGlucose (Point of Care In Office)
2019 09:35:00* 



             Test Item    Value        Reference Range Interpretation Comments

 

             Glucose POC Lifescan (test code = Glucose POC Lifescan) 124        

                             





[facility][O] Hemoglobin A1c (in office)2019 09:35:00
  * 



             Test Item    Value        Reference Range Interpretation Comments

 

             HEMOGLOBIN A1c (test code = 4548-4) 6.7                            

         





[facility][O] Hemoglobin A1c (in office)2019 09:32:00
  * 



             Test Item    Value        Reference Range Interpretation Comments

 

             HEMOGLOBIN A1c (test code = 4548-4) 7.5                            

         





[facility])2019 
09:32:00* 



             Test Item    Value        Reference Range Interpretation Comments

 

             Glucose POC Lifescan (test code = Glucose POC Lifescan) 118        

                             





[facility][O] Urine Dipstick (In Office)2019 10:56:00
  * 



             Test Item    Value        Reference Range Interpretation Comments

 

             Glucose (test code = Glucose) 100                       A          

   

 

             LEUKOCYTES (test code = LEUKOCYTES) ++                        A    

         

 

             NITRITE; Normal (test code = 19116-9) Negative                  N  

           

 

             UROBILINOGEN; Normal (test code = 98093-3) Negative                

  N             

 

             PROTEIN; Abnormal (test code = 77339-2) Trace                     A

             

 

             pH (test code = pH) 5                                       

 

             URINE BLOOD; Normal (test code = 95411-7) Negative                 

 N             

 

             SPECIFIC GRAVITY (test code = 2965-2) 1.020                        

           

 

             KETONES; Normal (test code = 69123-7) Negative                  N  

           

 

             BILIRUBIN; Normal (test code = 41787-3) Negative                  N

             

 

             COLOR URINE; Normal (test code = 5778-6) Yellow                    

N             





[facility]] URINALYSIS, COMPLETE W/REFLEX TO CULTURE
2019 00:00:00* 



             Test Item    Value        Reference Range Interpretation Comments

 

             COLOR; Normal (test code = 5778-6) YELLOW       YELLOW       N     

        

 

             APPEARANCE (test code = APPEARANCE) CLOUDY       CLEAR        A    

         

 

             SPECIFIC GRAVITY; Normal (test code = 2965-2) 1.019        1.001-1.

035  N             

 

             PH; Normal (test code = 2756-5) < OR = 5.0   5.0-8.0      N        

     

 

             GLUCOSE; Abnormal (test code = 1547-9) TRACE        NEGATIVE     A 

            

 

             BILIRUBIN; Normal (test code = 10286-8) NEGATIVE     NEGATIVE     N

             

 

             KETONES; Normal (test code = 10032-6) NEGATIVE     NEGATIVE     N  

           

 

             OCCULT BLOOD; Normal (test code = 99248-0) NEGATIVE     NEGATIVE   

  N             

 

             PROTEIN; Normal (test code = 71387-5) NEGATIVE     NEGATIVE     N  

           

 

             NITRITE (test code = NITRITE) NEGATIVE     NEGATIVE     N          

   

 

             LEUKOCYTE ESTERASE (test code = LEUKOCYTE ESTERASE) 2+           NE

GATIVE     A             

 

             WBC; Abnormal (test code = 6690-2) 6-10         < OR = 5     A     

        

 

             RBC; Normal (test code = 789-8) 0-2          < OR = 2     N        

     

 

             SQUAMOUS EPITHELIAL CELLS (test code = 60990-5) 0-5          < OR =

 5                   

 

             TRANSITIONAL EPITHELIAL CELLS (test code = 75460-1) 0-5          < 

OR = 5                   

 

             BACTERIA; Abnormal (test code = 630-4) MANY         NONE SEEN    A 

            

 

             HYALINE CAST; Normal (test code = 81742-9) NONE SEEN    NONE SEEN  

  N             





[facility][] REFLEXIVE URINE [id number] 00:00:00* 



             Test Item    Value        Reference Range Interpretation Comments

 

                          REFLEXIVE URINE CULTURE (test code = REFLEXIVE URINE C

ULTURE) CULTURE INDICATED 

- RESULTS TO FOLLOW                                          





[facility]] CULTURE, URINE, [id number] 00:00:00* 



             Test Item    Value        Reference Range Interpretation Comments

 

             CULTURE (test code = CULTURE) See Comment                          

  CULTURE, URINE, ROUTINE     MICRO 

NUMBER:      [id number]  TEST STATUS:       FINAL  SPECIMEN SOURCE:   URINE  
SPECIMEN QUALITY:  ADEQUATE  RESULT:            Three or more organisms present,
 each greater                     than 10,000 cu/mL. May represent normal anthony 
                    contamination from external genitalia. No further           
          testing is required.





[facility] PhysiciansMAMMOGRAPHY DIGITAL DX UNI [id number] 09:15:00  
                                                                                
    [facility] [facility]                        [address] [place] [address]      Patient Name: SUPRIYA MOULTON    
                               MR #: [id number]                     : 
951                                   Age/Sex: 68/F  Acct #: [id number]       
                       Req #: [id number]  Adm Physician:                        
                              Ordered by: CARLA DONG DO                  
          Report #: [id number]        Location: MAMMO                            
       Room/Bed:                     ___________________________________________
________________________________________________________    Procedure: [id number]
 MG/MAMMOGRAPHY DIGITAL DX UNI RT  Exam Date: 19                          
  Exam Time: 0845                                              REPORT STATUS: Si
ed       #[id number] - [id number]   #UNILATERAL RIGHT DIGITAL DIAGNOSTIC MAMMOG
MARIANA WITH SPOT COMPRESSION: 2019   Comparison is made to exam dated:  2018 mammogram - [facility].     Current study contains 
2 films.     There are scattered fibroglandular elements in the right breast.   
  There are benign calcifications in the right breast.  The density previously d
escribed appears to    press out.  No mass seen.    No significant masses, calci
fications, or other findings are seen in the breast.     There has been no signi
ficant interval change.      IMPRESSION: BENIGN   There is no mammographic evide
nce of malignancy.  A 1 year screening mammogram is recommended.    The patient 
will be notified by letter of the results.           Khadra Arguello Jr., D.O.     
        cw/:2019 12:33:36        Imaging Technologist: Astrid Daniels RT(R)
(M), [facility]   letter sent: Normal Exam     Mammogram
 BI-RADS: 2 Benign     Dictated By: KHADRA ARGUELLO DO  Electronically Signed By: 
KHADRA ARGUELLO DO on 19 1233  Transcribed By: LAUREN on 19 1233      
 COPY TO:   CARLA DONG DO        MAMMOGRAPHY DIGITAL SCR [id number] 
09:58:00                                                                        
              [place] [address]      Patient Name: 
SUPRIYA MOULTON                                   MR #: [id number]               
      : 1950                                   Age/Sex: 68/F  Acct #: 
[id number]                              Req #: [id number]  Adm Physician:     
                                                 Ordered by: CARLA DONG DO                            Report #: [id number]        Location: MAMMO        
                           Room/Bed:                     
_____________________________________________
______________________________________________________    Procedure: [id number] CHRISTIAN TAYLOR/MAMMOGRAPHY DIGITAL SCR BILAT  Exam Date: 18                            
Exam Time: 0916                                              REPORT STATUS: Sign
ed       #[id number] - [id number]   #BILATERAL DIGITAL SCREENING MAMMOGRAM WITH
 CAD: 2018   CLINICAL: Routine screening.        Comparison is made to nevin kumar dated:  2013 mammogram - [facility].  Current study    cont
ains 8 films.     There are scattered fibroglandular elements in both breasts.  
   Current study was also evaluated with a Computer Aided Detection (CAD) system
.     There is an irregular asymmetry in the right breast at 1 o'clock middle de
pth.  This is best noted    on the implant displacement views.  The implants reginald
ear intact.  Scattered benign appearing    calcifications are present bilaterall
y.      No other significant masses, calcifications, or other findings are seen 
in either breast.        IMPRESSION: INCOMPLETE: NEEDS ADDITIONAL IMAGING EVALUA
TION   The irregular asymmetry in the right breast is indeterminate.  Additional
 views with possible    ultrasound are recommended.        The patient will be c
ontacted by the Mammography Department to schedule this appointment.           ASIF Arguello Jr., D.O.             cw/:2019 14:00:01        Imaging Technolog
ist: Astrid KAT)(CHRISTIAN), [facility]   letter sent: 
Additional Imaging Needed     Mammogram BI-RADS: 0 Indeterminate     Dictated By
: KHADRA ARGUELLO DO  Electronically Signed By: KHADRA ARGUELLO DO on 19 1400 
 Transcribed By: LAUREN on 19 1400       COPY TO:   CARLA DONG DO   
     [O] Flu Test (in Office )2018 00:00:00* 



             Test Item    Value        Reference Range Interpretation Comments

 

             Flu A (test code = Flu A) negative                                

 

             Flu B (test code = Flu B) negative                                





[facility] 
10:05:00* 



             Test Item    Value        Reference Range Interpretation Comments

 

             Glucose POC Lifescan (test code = Glucose POC Lifescan) 138        

                             





[facility][O] Hemoglobin A1c (in office)2018 10:05:00
  * 



             Test Item    Value        Reference Range Interpretation Comments

 

             HEMOGLOBIN A1c (test code = 4548-4) 6.4                            

         





[facility]] LIPID PANEL2018-10-30 08:40:00* 



             Test Item    Value        Reference Range Interpretation Comments

 

             CHOLESTEROL, TOTAL; Above High Threshold (test code = 2093-3) 240 m

g/dl    <200                       

 

             HDL CHOLESTEROL; Below Low Threshold (test code = 2085-9) 45 mg/dl 

    >50                        

 

             TRIGLYCERIDES; Above High Threshold (test code = 2571-8) 324 mg/dl 

   <150                       

 

                LDL-CHOLESTEROL; Above High Threshold (test code = 95863-5) 145 

{MG/DL  LALI}                  

                                        Reference range: <100 Desirable range <1

00 mg/dL for primary prevention;  <70 

mg/dL for patients with CHD or diabetic patients with > or = 2 CHD risk factors.
 LDL-C is now calculated using the Clarisa calculation, which is a 
validated novel method providing better accuracy than the Friedewald equation in
 the estimation of LDL-C. Lefty HARMON et al. LISETTE. 2013;310(19): 8502-5926 (http
://education.BrandProject.Doktorburada.com/faq/BAO146)

 

             CHOL/HDLC RATIO (test code = CHOL/HDLC RATIO) 5.3 {CALC}   <5.0    

                   

 

             NON HDL CHOLESTEROL (test code = NON HDL CHOLESTEROL) 195 {MG/DL  C

AL} <130                      

For patients with diabetes plus 1 major ASCVD risk factor, treating to a 
non-HDL-C goal of <100 mg/dL (LDL-C of <70 mg/dL) is considered a therapeutic 
option.





[facility]] CMP W/EGFR2018-10-30 08:40:00* 



             Test Item    Value        Reference Range Interpretation Comments

 

             GLUCOSE; Above High Threshold (test code = 1547-9) 130 mg/dl    65-

99                     Fasting 

reference interval For someone without known diabetes, a glucosevalue >125 mg/dL
 indicates that they may havediabetes and this should be confirmed with afollow-
up test.

 

             UREA NITROGEN (BUN) (test code = UREA NITROGEN (BUN)) 43 mg/dl     

7-25                       

 

             CREATININE (test code = CREATININE) 1.17 mg/dl   0.50-0.99         

        For patients >49 

years of age, the reference limitfor Creatinine is approximately 13% higher for 
peopleidentified as -American.

 

                          eGFR NON- (test code = eGFR NON-ZAIDA

N AMERICAN) 49 

{ML/MIN/1.7}        > OR = 60                                

 

                    eGFR  (test code = eGFR ) 56

 {ML/MIN/1.7}     > OR =

 60                                                  

 

             BUN/CREATININE RATIO (test code = BUN/CREATININE RATIO) 37 {CALC}  

  6-22                       

 

             SODIUM (test code = SODIUM) 143 mmol/L   135-146      N            

 

 

             POTASSIUM (test code = POTASSIUM) 4.5 mmol/L   3.5-5.3      N      

       

 

             CHLORIDE (test code = CHLORIDE) 105 mmol/L          N        

     

 

             CARBON DIOXIDE (test code = CARBON DIOXIDE) 27 mmol/L    20-32     

   N             

 

             CALCIUM (test code = CALCIUM) 10.6 mg/dl   8.6-10.4                

   

 

             PROTEIN, TOTAL (test code = PROTEIN, TOTAL) 6.9 g/dl     6.1-8.1   

   N             

 

             ALBUMIN (test code = ALBUMIN) 4.7 g/dl     3.6-5.1      N          

   

 

             GLOBULIN (test code = GLOBULIN) 2.2 {G/DL  CALC} 1.9-3.7      N    

         

 

                ALBUMIN/GLOBULIN RATIO (test code = ALBUMIN/GLOBULIN RATIO) 2.1 

{CALC}      1.0-2.5         N

                                         

 

             BILIRUBIN, TOTAL; Normal (test code = 83723-2) 0.5 mg/dl    0.2-1.2

      N             

 

             ALKALINE PHSPHATASE (test code = ALKALINE PHSPHATASE) 49 u/l       

       N             

 

             AST; Normal (test code = 1916-6) 17 u/l       10-35        N       

      

 

             ALT; Normal (test code = 1742-6) 8 u/l        6-29         N       

      





[facility]] MICROALBUMIN, RANDOM URINE (W/CREATININE)
2018-10-30 08:40:00* 



             Test Item    Value        Reference Range Interpretation Comments

 

                CREATININE, RANDOM URINE (test code = CREATININE, RANDOM URINE) 

69 mg/dl                  

N                                        

 

             MICROALBUMIN (test code = MICROALBUMIN) 10.6 mg/dl                N

            Reference RangeNot 

established

 

                                        MICROALBUMIN/CREATININE RATIO, RANDOM UR

INE (test code = MICROALBUMIN/CREATININE

 RATIO, RANDOM URINE) 154 {MCG/MG CRE} <30                             The ADA d

efines abnormalities in 

albuminexcretion as follows: Category         Result (mcg/mg creatinine) Normal 
                   <30Microalbuminuria          Clinical albuminuria   > 
OR = 300 The ADA recommends that at least two of threespecimens collected within
 a 3-6 month period beabnormal before considering a patient to bewithin a 
diagnostic category.





[facility]2018 
13:07:00* 



             Test Item    Value        Reference Range Interpretation Comments

 

             Glucose POC Lifescan (test code = Glucose POC Lifescan) 131        

                             





[facility][O] Hemoglobin A1c (in office)2018 13:07:00
  * 



             Test Item    Value        Reference Range Interpretation Comments

 

             HEMOGLOBIN A1c (test code = 4548-4) 7.3                            

         





[facility]] LIPID [id number] 08:04:00* 



             Test Item    Value        Reference Range Interpretation Comments

 

             CHOLESTEROL, TOTAL; Above High Threshold (test code = 2093-3) 283 m

g/dl    <200                       

 

             HDL CHOLESTEROL; Below Low Threshold (test code = 2085-9) 37 mg/dl 

    >50                        

 

             TRIGLYCERIDES; Above High Threshold (test code = 2571-8) 754 mg/dl 

   <150                       

 

             LDL-CHOLESTEROL (test code = 04946-5) See Comment                  

          LDL cholesterol not 

calculated. Triglyceride levelsgreater than 400 mg/dL invalidate calculated LDL 
results. Reference range: <100 Desirable range <100 mg/dL for primary 
prevention;  <70 mg/dL for patients with CHD or diabetic patients with > or = 2 
CHD risk factors. LDL-C is now calculated using the Clarisa calculation, 
which is a validated novel method providing better accuracy than the Friedewald 
equation in the estimation of LDL-C. Lefty HARMON et al. LISETTE. 2013;310(19): 2061-
2068 (http://education.Mirimus/faq/IVU721)

 

             CHOL/HDLC RATIO (test code = CHOL/HDLC RATIO) 7.6 {CALC}   <5.0    

                   

 

             NON HDL CHOLESTEROL (test code = NON HDL CHOLESTEROL) 246 {MG/DL  C

AL} <130                      

Non-HDL level > or = 220 is very high and may indicate genetic familial 
hypercholesterolemia (FH). Clinical assessment and measurement of blood lipid 
levels should be considered for all first-degree relatives of patients with an 
FH diagnosis. For patients with diabetes plus 1 major ASCVD risk factor, 
treating to a non-HDL-C goal of <100 mg/dL (LDL-C of <70 mg/dL) is considered a 
therapeutic option.





[facility]] CMP W/[id number] 08:04:00* 



             Test Item    Value        Reference Range Interpretation Comments

 

             GLUCOSE; Above High Threshold (test code = 1547-9) 136 mg/dl    65-

99                     Fasting 

reference interval For someone without known diabetes, a glucosevalue >125 mg/dL
 indicates that they may havediabetes and this should be confirmed with afollow-
up test.

 

             UREA NITROGEN (BUN) (test code = UREA NITROGEN (BUN)) 45 mg/dl     

7-25                       

 

             CREATININE (test code = CREATININE) 1.03 mg/dl   0.50-0.99         

        For patients >49 

years of age, the reference limitfor Creatinine is approximately 13% higher for 
peopleidentified as -American.

 

                          eGFR NON- (test code = eGFR NON-ZAIDA

N AMERICAN) 57 

{ML/MIN/1.7}        > OR = 60                                

 

                    eGFR  (test code = eGFR ) 66

 {ML/MIN/1.7}     > OR =

 60                       N                          

 

             BUN/CREATININE RATIO (test code = BUN/CREATININE RATIO) 44 {CALC}  

  6-22                       

 

             SODIUM (test code = SODIUM) 140 mmol/L   135-146      N            

 

 

             POTASSIUM (test code = POTASSIUM) 4.7 mmol/L   3.5-5.3      N      

       

 

             CHLORIDE (test code = CHLORIDE) 109 mmol/L          N        

     

 

             CARBON DIOXIDE (test code = CARBON DIOXIDE) 21 mmol/L    20-31     

   N             

 

             CALCIUM (test code = CALCIUM) 10.2 mg/dl   8.6-10.4     N          

   

 

             PROTEIN, TOTAL (test code = PROTEIN, TOTAL) 7.0 g/dl     6.1-8.1   

   N             

 

             ALBUMIN (test code = ALBUMIN) 4.7 g/dl     3.6-5.1      N          

   

 

             GLOBULIN (test code = GLOBULIN) 2.3 {G/DL  CALC} 1.9-3.7      N    

         

 

                ALBUMIN/GLOBULIN RATIO (test code = ALBUMIN/GLOBULIN RATIO) 2.0 

{CALC}      1.0-2.5         N

                                         

 

             BILIRUBIN, TOTAL; Normal (test code = 88723-3) 0.6 mg/dl    0.2-1.2

      N             

 

             ALKALINE PHSPHATASE (test code = ALKALINE PHSPHATASE) 84 u/l       

       N             

 

             AST; Normal (test code = 1916-6) 17 u/l       10-35        N       

      

 

             ALT; Normal (test code = 1742-6) 10 u/l       6-29         N       

      





[facility]] MICROALBUMIN, RANDOM URINE (W/CREATININE)
2018 08:04:00* 



             Test Item    Value        Reference Range Interpretation Comments

 

                CREATININE, RANDOM URINE (test code = CREATININE, RANDOM URINE) 

90 mg/dl                  

N                                        

 

             MICROALBUMIN (test code = MICROALBUMIN) 10.1 mg/dl                N

            Reference RangeNot 

established

 

                                        MICROALBUMIN/CREATININE RATIO, RANDOM UR

INE (test code = MICROALBUMIN/CREATININE

 RATIO, RANDOM URINE) 112 {MCG/MG CRE} <30                             The ADA d

efines abnormalities in 

albuminexcretion as follows: Category         Result (mcg/mg creatinine) Normal 
                   <30Microalbuminuria          Clinical albuminuria   > 
OR = 300 The ADA recommends that at least two of threespecimens collected within
 a 3-6 month period beabnormal before considering a patient to bewithin a 
diagnostic category.





[facility][O] Hemoglobin A1c (in office)2018 11:33:00
  * 



             Test Item    Value        Reference Range Interpretation Comments

 

             HEMOGLOBIN A1c (test code = 4548-4) 6.3                            

         





[facility]2018 
11:32:00* 



             Test Item    Value        Reference Range Interpretation Comments

 

             Glucose POC Lifescan (test code = Glucose POC Lifescan) 101        

                             





[facility][O] Hemoglobin A1c (in office)2018 11:55:00
  * 



             Test Item    Value        Reference Range Interpretation Comments

 

             HEMOGLOBIN A1c (test code = 4548-4) 8.1                            

         





[facility] ([facility])2018 
11:53:00* 



             Test Item    Value        Reference Range Interpretation Comments

 

             Glucose POC Lifescan (test code = Glucose POC Lifescan) 216        

                             





[facility]

## 2020-09-27 NOTE — DIAGNOSTIC IMAGING REPORT
EXAMINATION:  CHEST SINGLE (PORTABLE)    



INDICATION:      

^chest pain

^Y



COMPARISON:  None available.

     

FINDINGS:  AP view   



TUBES and LINES:  None.



LUNGS:  Lungs are well inflated.  There is no evidence of pneumonia or

pulmonary edema.



PLEURA:  No pleural effusion or pneumothorax.



HEART AND MEDIASTINUM:  The cardiomediastinal silhouette is unremarkable.    



BONES AND SOFT TISSUES:  No acute osseous lesion.  Soft tissues are

unremarkable.



UPPER ABDOMEN: No free air under the diaphragm.    



IMPRESSION: 

No acute thoracic abnormality.





Signed by: Dr. Amandeep Tyson MD on 9/27/2020 8:41 PM

## 2020-09-27 NOTE — EMERGENCY DEPARTMENT NOTE
History of Present Illnes


History of Present Illness


Chief Complaint:  Chest Pain


History of Present Illness


This is a 69 year old  female  PT AAOX3 PRESENTS TO THE ER C/O NON-

RADIATING MIDSTERNAL CP ONSET


   THIS EVENING AFTER WAKING UP FROM [place]AP AROUND 1800; PT ALSO REPORTS SOB " FOR


   AWHILE"; PT REPORTS PAIN 5/10; NAD NOTED AT THIS TIME;.


Historian:  Patient


Arrival Mode:  Car


 Required:  No


Onset (how long ago):  hour(s) (2)


Location:  CHEST


Quality:  PAIN


Radiation:  Reports non-radiation


Severity:  moderate (5/10)


Onset quality:  sudden


Duration (how long):  hour(s) (2)


Timing of current episode:  constant


Progression:  unchanged


Chronicity:  new


Context:  Denies recent illness, Denies recent surgery, Denies trauma/injury


Relieving factors:  none


Exacerbating factors:  none


Associated symptoms:  Reports denies other symptoms, Reports shortness of breath

(FOR LAST COUPLE OF WEEKS)





Past Medical/Family History


Physician Review


I have reviewed the patient's past medical and family history.  Any updates have

been documented here.





Past Medical History


Recent Fever:  No


Clinical Suspicion of Infectio:  No


New/Unexplained Change in Ment:  No


Past Medical History:  Hypertension, Diabetes, MI, CAD, GERD, Hyperlipedemia


Other Medical History:  


MI X2


Other Surgery:  


CARPAL TUNNEL





LAP BAND





CARDIAC STENT X2





Social History


Smoking Cessation:  Never Smoker


Alcohol Use:  None


Any Illegal Drug Use:  No





Family History


Family history of heart diseas:  Yes


Other family history


HTN,DM,CAD





Other


Last Tetanus:  UNK





Review of Systems


Review of Systems


Constitutional:  Reports no symptoms


EENTM:  Reports no symptoms


Cardiovascular:  Reports as per HPI


Respiratory:  Reports as per HPI


Gastrointestinal:  Reports no symptoms


Genitourinary:  Reports no symptoms


Musculoskeletal:  Reports no symptoms


Integumentary:  Reports no symptoms


Neurological:  Reports no symptoms


Psychological:  Reports no symptoms


Endocrine:  Reports no symptoms


Hematological/Lymphatic:  Reports no symptoms





Physical Exam


Related Data


Allergies:  


Coded Allergies:  


     No Known Allergies (Unverified , 8/26/11)


Triage Vital Signs





Vital Signs








  Date Time  Temp Pulse Resp B/P (MAP) Pulse Ox O2 Delivery O2 Flow Rate FiO2


 


9/27/20 19:58 98.7 101 20 230/109 98 Room Air  








Vital signs reviewed:  Yes





Physical Exam


CONSTITUTIONAL





Constitutional:  Present well-developed, Present well-nourished


HENT


HENT:  Present normocephalic, Present atraumatic, Present oropharynx 

clear/moist, Present nose normal


HENT L/R:  Present left ext ear normal, Present right ext ear normal


EYES





Eyes:  Reports PERRL, Reports conjunctivae normal


NECK


Neck:  Present ROM normal


PULMONARY


Pulmonary:  Present effort normal, Present breath sounds normal


CARDIOVASCULAR





Cardiovascular:  Present regular rhythm, Present heart sounds normal, Present 

capillary refill normal, Present normal rate


GASTROINTESTINAL





Abdominal:  Present soft, Present nontender, Present bowel sounds normal


GENITOURINARY





Genitourinary:  Present exam deferred


SKIN


Skin:  Present warm, Present dry


MUSCULOSKELETAL





Musculoskeletal:  Present ROM normal


NEUROLOGICAL





Neurological:  Present alert, Present oriented x 3, Present no gross motor or 

sensory deficits


PSYCHOLOGICAL


Psychological:  Present mood/affect normal, Present judgement normal





Results


Laboratory


Result Diagram:  


9/27/20 2009





Laboratory





Laboratory Tests








Test


 9/27/20


20:09


 


White Blood Count


 4.36 x10e3/uL


(4.8-10.8)


 


Red Blood Count


 4.45 x10e6/uL


(3.6-5.1)


 


Hemoglobin


 12.8 g/dL


(12.0-16.0)


 


Hematocrit


 38.5 %


(34.2-44.1)


 


Mean Corpuscular Volume


 86.5 fL


(81-99)


 


Mean Corpuscular Hemoglobin


 28.8 pg


(28-32)


 


Mean Corpuscular Hemoglobin


Concent 33.2 g/dL


(31-35)


 


Red Cell Distribution Width


 14.2 %


(11.7-14.4)


 


Platelet Count


 243 x10e3/uL


(140-360)


 


Neutrophils (%) (Auto)


 64.9 %


(38.7-80.0)


 


Lymphocytes (%) (Auto)


 21.1 %


(18.0-39.1)


 


Monocytes (%) (Auto)


 9.9  %


(4.4-11.3)


 


Eosinophils (%) (Auto)


 2.1 %


(0.0-6.0)


 


Basophils (%) (Auto)


 0.9 %


(0.0-1.0)


 


Neutrophils # (Auto) 2.8 (2.1-6.9) 


 


Lymphocytes # (Auto) 0.9 (1.0-3.2) 


 


Monocytes # (Auto) 0.4 (0.2-0.8) 


 


Eosinophils # (Auto) 0.1 (0.0-0.4) 


 


Basophils # (Auto) 0.0 (0.0-0.1) 


 


Absolute Immature Granulocyte


(auto 0.05 x10e3/uL


(0-0.1)


 


Prothrombin Time


 11.3 seconds


(11.9-14.5)


 


Prothromb Time International


Ratio 0.78 





 


Activated Partial


Thromboplast Time 27.5 seconds


(23.8-35.5)


 


Sodium Level


 141 mmol/L


(136-145)


 


Potassium Level


 4.4 mmol/L


(3.5-5.1)


 


Chloride Level


 107 mmol/L


()


 


Carbon Dioxide Level


 19 mmol/L


(22-29)


 


Anion Gap


 19.4 mmol/L


(8-16)


 


Blood Urea Nitrogen


 38 mg/dL


(7-26)


 


Creatinine


 1.38 mg/dL


(0.57-1.11)


 


Estimat Glomerular Filtration


Rate 38 ML/MIN


(60-)


 


BUN/Creatinine Ratio 28 (6-25) 


 


Glucose Level


 182 mg/dL


()


 


Calcium Level


 11.1 mg/dL


(8.4-10.2)


 


Total Bilirubin


 0.3 mg/dL


(0.2-1.2)


 


Aspartate Amino Transf


(AST/SGOT) 15 IU/L (5-34) 





 


Alanine Aminotransferase


(ALT/SGPT) 9 IU/L (0-55) 





 


Alkaline Phosphatase


 105 IU/L


()


 


Creatine Kinase


 107 IU/L


()


 


Creatine Kinase MB


 2.00 ng/mL


(0-5.0)


 


Troponin I


 0.033 ng/mL


(0-0.300)


 


B-Type Natriuretic Peptide


 37.3 pg/mL


(0-100)


 


Total Protein


 8.1 g/dL


(6.5-8.1)


 


Albumin


 4.7 g/dL


(3.5-5.0)


 


Globulin


 3.4 g/dL


(2.3-3.5)


 


Albumin/Globulin Ratio 1.4 (0.8-2.0) 








Laboratory Tests








Test


 9/27/20


20:09


 


White Blood Count


 4.36 x10e3/uL


(4.8-10.8)


 


Red Blood Count


 4.45 x10e6/uL


(3.6-5.1)


 


Hemoglobin


 12.8 g/dL


(12.0-16.0)


 


Hematocrit


 38.5 %


(34.2-44.1)


 


Mean Corpuscular Volume


 86.5 fL


(81-99)


 


Mean Corpuscular Hemoglobin


 28.8 pg


(28-32)


 


Mean Corpuscular Hemoglobin


Concent 33.2 g/dL


(31-35)


 


Red Cell Distribution Width


 14.2 %


(11.7-14.4)


 


Platelet Count


 243 x10e3/uL


(140-360)


 


Neutrophils (%) (Auto)


 64.9 %


(38.7-80.0)


 


Lymphocytes (%) (Auto)


 21.1 %


(18.0-39.1)


 


Monocytes (%) (Auto)


 9.9  %


(4.4-11.3)


 


Eosinophils (%) (Auto)


 2.1 %


(0.0-6.0)


 


Basophils (%) (Auto)


 0.9 %


(0.0-1.0)


 


Neutrophils # (Auto) 2.8 (2.1-6.9) 


 


Lymphocytes # (Auto) 0.9 (1.0-3.2) 


 


Monocytes # (Auto) 0.4 (0.2-0.8) 


 


Eosinophils # (Auto) 0.1 (0.0-0.4) 


 


Basophils # (Auto) 0.0 (0.0-0.1) 


 


Absolute Immature Granulocyte


(auto 0.05 x10e3/uL


(0-0.1)








Lab results reviewed:  Yes





Imaging


Imaging results reviewed:  Yes


Impressions


Procedure: [id number] DX/CHEST SINGLE (PORTABLE)


Exam Date:                             Exam Time: 








                              REPORT STATUS: Signed





EXAMINATION:  CHEST SINGLE (PORTABLE)    





INDICATION:      


^chest pain


^Y





COMPARISON:  None available.


     


FINDINGS:  AP view   





TUBES and LINES:  None.





LUNGS:  Lungs are well inflated.  There is no evidence of pneumonia or


pulmonary edema.





PLEURA:  No pleural effusion or pneumothorax.





HEART AND MEDIASTINUM:  The cardiomediastinal silhouette is unremarkable.    





BONES AND SOFT TISSUES:  No acute osseous lesion.  Soft tissues are


unremarkable.





UPPER ABDOMEN: No free air under the diaphragm.    





IMPRESSION: 


No acute thoracic abnormality.








Signed by: Dr. Amandeep Khan MD on 9/27/2020 8:41 PM








Dictated By: AMANDEEP KHAN MD


Electronically Signed By: AMANDEEP KHAN MD on 09/27/20 2041


Transcribed By: MARY on 09/27/20 2041 








COPY TO:   TREVER NUÑEZ MD~





Procedures


12 Lead ECG Interpretation


ECG Interpretation :  


   ECG:  ECG 1


   :  Interpreted by ED physician


   Date:  Sep 27, 2020


   Time:  19:55


   Rhythm:  sinus tachycardia


   Rate:  tachycardia


   BPM:  102


   QRS axis:  normal


   ST segments normal:  No (NONSPECIFIC ST CHANGES V2 AND V3)


   T waves normal:  Yes


   T waves flattening:  V6


   Other findings:  no other findings


   Clinical Impression:  abnormal ECG





Assessment & Plan


Medical Decision Making


[facility]


PT WITH H/O MI, HTN, DM WITH CHEST PAIN FOR 2 HOURS





CBC, CMP,CARDIAC ENZYMES, EKG, BNP, CXR, ORDERED TO EVAL FOR MYOCARDIAL 

INFARCTION, PNEUMONIA, ELECTROLYTE ABNORMALITY, 





NITROPASTE 1 INCH TO CHEST WALL ORDERED


ASPIRIN 324 MG PO ORDERED








I SPOKE WITH DR BHATT AND DR HOLLIS, PLACE PT IN OBS





Reassessment


Reassessment time:  21:08


Reassessment


PT STATES CHEST PAIN RESOLVED AT THIS TIME BUT THE SOB SENSATION SHE HAS BEEN 

HAVING FOR PAST COUPLE OF WEEKS IS STILL PRESENT





Assessment & Plan


Final Impression:  


(1) Chest pain


Depart Disposition:  ADMITTED


Last Vital Signs











  Date Time  Temp Pulse Resp B/P (MAP) Pulse Ox O2 Delivery O2 Flow Rate FiO2


 


9/27/20 19:58 98.7 101 20 230/109 98 Room Air  








Home Meds


Active Scripts


Clopidogrel Bisulfate* (PLAVIX) 75 Mg Tablet, 75 MG PO DAILY, #30 TAB


   Prov:HAYLEY HOLLIS MD         9/26/15


Reported Medications


Levofloxacin (LEVAQUIN) 500 Mg Tablet, 500 MG PO DAILY for 7 Days, TAB


   5/17/16


Ondansetron (ZOFRAN ODT) 4 Mg Tab.rapdis, 4 MG PO Q8H PRN for NAUSEA, TAB


   5/17/16


Oxybutynin Chloride (DITROPAN XL) 5 Mg Tab.er.24, 5 MG PO BID PRN for BLADDER 

SPASMS, #30 TAB


   5/17/16


Acetaminophen With Codeine (TYLENOL WITH CODEINE #3 TABLET) 1 Each Tablet, 300 

MG PO Q4HR PRN for PAIN, TAB


   5/17/16


Insulin Detemir (LEVEMIR) 100 Unit/1 Ml Vial, 42 UNITS SQ HS


   5/15/16


Insulin Aspart (NOVOLOG MIX 70-30 VIAL) 100 Units/Ml  Ml, 18 UNITS SQ AC


   5/15/16


Metformin Hcl (METFORMIN HCL) 500 Mg Tablet, 1000 MG PO BID, #60 TAB


   5/15/16


Aspirin (ASPIRIN CHEW) 81 Mg Chew, 81 MG PO DAILY, #30 TAB


   5/15/16


Atorvastatin Calcium (LIPITOR) 20 Mg Tablet, 20 MG PO HS, #30 TAB


   5/19/15


Amlodipine Besylate (NORVASC) 5 Mg Tab, 5 MG PO DAILY, #30 TAB


   5/19/15


Irbesartan (IRBESARTAN) 300 Mg Tablet, 300 MG PO DAILY


   5/16/15


Medications in the ED





Aspirin 324 mg ONCE  ONCE PO ;  Start 9/27/20 at 20:15;  Stop 9/27/20 at 20:16; 

Status DC


Nitroglycerin 1 gm ONCE  ONCE TOP ;  Start 9/27/20 at 20:15;  Stop 9/27/20 at 

20:16;  Status DC











TREVER NUÑEZ MD        Sep 27, 2020 20:36

## 2020-09-27 NOTE — XMS REPORT
Clinical Summary

                             Created on: 2020



Lorena Morejon

External Reference #: [id number]

: 1950

Sex: Female



Demographics





                          Address                   [address] [place] [address]  [address]

 

                          Home Phone                [phone]

 

                          Preferred Language        English

 

                          Marital Status            

 

                          Congregation Affiliation     Unknown

 

                          Race                      White

 

                          Ethnic Group              Non-





Author





                          Author                    [facility]

 

                          Organization              [facility]

 

                          Address                   Unknown

 

                          Phone                     Unavailable







Support





                Name            Relationship    Address         Phone

 

                Luc Morejon   [employer]            Unknown         [phone]







Care Team Providers





                    Care Team Member Name Role                Phone

 

                    Kayla Paz MD PCP                 [phone]







Allergies





                                        Comments



                 Active Allergy  Reactions       Severity        Noted Date 

 

                                        



n/v



                     Codeine             GI                  2020 



                                         Intolerance   







Medications





                          End Date                  Status



              Medication   Sig          Dispensed    Refills      Start  



                                         Date  

 

                                                    Active



                 clopidogrel (PLAVIX) 75  Take 75 mg by   0               /

201  



                     mg tablet           mouth daily.        9  

 

                                                    Active



                     cholecalciferol, vitamin  Take by             0   



                           D3, (DIALYVITE VITAMIN D  mouth.     



                                         ORAL)      

 

                                                    Active



                     insulin regular, human  Inject 20           0   



                           (NOVOLIN R REGULAR U-100  Units as     



                           INSULN INJ)               directed 2     



                                         (two) times a     



                                         day.     

 

                                                    Active



                     carvedilol (COREG) 3.125  Take 25 mg by       0   



                           MG tablet                 mouth 2 (two)     



                                         times a day     



                                         with meals.     

 

                                                    Active



                     aspirin (ECOTRIN) 81 MG  Take 81 mg by       0   



                           enteric coated tablet     mouth daily.     

 

                                                    Active



                     atorvastatin (LIPITOR) 40  Take 40 mg by       0   



                           MG tablet                 mouth daily.     

 

                                                    Active



                     famotidine (PEPCID) 20 MG  Take 20 mg by       0   



                           tablet                    mouth 2 (two)     



                                         times a day     



                                         as needed for     



                                         indigestion.     

 

                                                    Active



                     nitroglycerin (NITROSTAT)  Place 0.4 mg        0   



                           0.4 MG SL tablet          under the     



                                         tongue every     



                                         5 (five)     



                                         minutes as     



                                         needed for     



                                         chest pain.     

 

                                                    Active



                     irbesartan (AVAPRO) 300  Take 300 mg         0   



                           MG tablet                 by mouth     



                                         nightly.     

 

                          10/24/2019                Discontinued



                 irbesartan (AVAPRO) 300  Take 300 mg     0               

201  



                     MG tablet           by mouth            9  



                                         daily.     

 

                          10/24/2019                Discontinued



                 allopurinol (ZYLOPRIM)  300 mg.         0               

01  



                           300 MG tablet             8  

 

                          10/24/2019                Discontinued



                     metformin HCl (METFORMIN  Take 1,000 mg       0   



                           ORAL)                     by mouth 2     



                                         (two) times a     



                                         day.     

 

                          10/24/2019                Discontinued



                     fexofenadine HCl (ALLEGRA  Take by             0   



                           ORAL)                     mouth.     

 

                          10/24/2019                Discontinued



                 fenofibrate (TRICOR) 145  TAKE ONE (1)    3                 



                     MG tablet           TABLET(S) BY        8  



                                         MOUTH DAILY.     

 

                          10/24/2019                Discontinued



                     ibuprofen (ADVIL,MOTRIN)  Take 800 mg         0   



                           800 MG tablet             by mouth     



                                         every 6 (six)     



                                         hours as     



                                         needed for     



                                         mild pain.     

 

                          10/24/2019                Discontinued



              ondansetron (ZOFRAN) 4 MG  Take 1 tablet  20 tablet    0          

    



                     tabletIndications:  (4 mg total)        9  



                           Quang's deformity,      by mouth     



                           right                     every 8     



                                         (eight) hours     



                                         as needed for     



                                         nausea or     



                                         vomiting.     

 

                          2020                Discontinued



                     modafinil (PROVIGIL) 100  Take 100 mg         0   



                           MG tablet                 by mouth     



                                         daily.     

 

                          2020                Discontinued (Therapy comple

virgen)



                     isosorbide dinitrate  Take 40 mg by       0   



                           (ISORDIL) 40 MG tablet    mouth 3     



                                         (three) times     



                                         a day.     

 

                          2020                Discontinued (Stop Taking at

 Discharge)



                     UNABLE TO FIND      Med Name: ***       0   

 

                          2020                



              HYDROcodone-acetaminophen  Take 1-2     20 tablet    0            

  



                     (NORCO) 5-325 mg per  tablets by          0  



                           tabletIndications: acute  mouth every 6     



                           pain                      (six) hours     



                                         as needed for     



                                         moderate pain     



                                         or severe     



                                         pain for up     



                                         to 30 days     



                                         .acute pain.     







Active Problems





 



                           Problem                   Noted Date

 

 



                           History of removal of laparoscopic gastric banding de

vice  2020

 

 



                           GERD (gastroesophageal reflux disease)  10/24/2019

 

 



                           History of laparoscopic adjustable gastric banding  1



 

 



                           Hypertension              10/24/2019

 

 



                           Hyperlipidemia            10/24/2019

 

 



                           CAD (coronary artery disease)  10/24/2019

 

 



                           Diabetes mellitus         10/24/2019

 

 



                           Obstructive sleep apnea syndrome  10/24/2019

 

 



                           Quang's deformity, right  2019







Encounters





                          Care Team                 Description



                     Date                Type                Specialty  

 

                                        



Perez Stanford MD Litaker, Jennifer L, RD                 Obesity (BMI 30.0-34.9); 

History of removal of laparoscopic gastric banding device



                     2020          Consult             Weight Management  

 

                                        



Marilyn Wright MA                   Obesity (BMI 30.0-34.9) (Primary Dx); 

History of removal of laparoscopic gastric banding device



                     2020          Orders Only         General Surgery  

 

                                        



Perez Stanford MD                History of removal of laparoscopic gastr

ic banding 

device (Primary Dx); 

Surgery follow-up examination



                     2020          Office Visit        General Surgery  

 

                                        



Atif Larson MD Mathew, Jibie Elizabeth                  



                     2020          Anesthesia          General Surgery  



                                         Event   

 

                                        



Perez Stanford MD                REMOVAL, GASTRIC BAND, LAPAROSCOPIC WITH

 TAP BLOCK



                     2020          Surgery             General Surgery  

 

                                        



Perez Stanford MD                History of removal of laparoscopic gastr

ic banding 

device (Primary Dx); 

Preop testing; 

History of laparoscopic adjustable gastric banding; 

Coronary artery disease involving native heart, angina presence unspecified, 
unspecified vessel or lesion type; 

Essential hypertension; 

Obstructive sleep apnea syndrome; 

Gastroesophageal reflux disease with esophagitis; 

Type 2 diabetes mellitus without complication, with long-term current use of 
insulin (HCC); 

Hyperlipidemia, unspecified hyperlipidemia type; 

Other complications of gastric band procedure



                     2020          [facility]            General Surgery  



                                         Encounter   

 

                                        



Perez Stanford MD                Preop testing (Primary Dx)



                     2020          Pre-Admit           Pre-Admission Testi

ng  



                                         Testing   



                                         Appointment   

 

                                        



Perez Stanford MD                History of laparoscopic adjustable gastr

ic banding 

(Primary Dx); 

Coronary artery disease involving native heart, angina presence unspecified, 
unspecified vessel or lesion type; 

Essential hypertension; 

Obstructive sleep apnea syndrome; 

Gastroesophageal reflux disease with esophagitis; 

Type 2 diabetes mellitus without complication, with long-term current use of 
insulin (HCC); 

Hyperlipidemia, unspecified hyperlipidemia type



                     2020          Office Visit        General Surgery  

 

                                        



Javad Vásquez MD           Quang's deformity, right (Primary Dx)



                     2019          Office Visit        Orthopedic Surgery 

 

 

                                        



Christina Colon MA                      Quang's deformity, right (Primary Dx)



                     2019          Orders Only         Orthopedic Surgery 

 

 

                                        



Perez Stanford MD                History of laparoscopic adjustable gastr

ic banding 

(Primary Dx); 

Coronary artery disease involving native heart, angina presence unspecified, 
unspecified vessel or lesion type; 

Essential hypertension; 

Obstructive sleep apnea syndrome; 

Gastroesophageal reflux disease with esophagitis; 

Type 2 diabetes mellitus without complication, with long-term current use of 
insulin (HCC)



                     2019          Office Visit        General Surgery  

 

                                        



Perez Stanford MD                History of laparoscopic adjustable gastr

ic banding; 

Essential hypertension; 

Coronary artery disease involving native heart, angina presence unspecified, 
unspecified vessel or lesion type



                     2019          Hospital            Radiology  



                                         Encounter   

 

                                        



Perez Stanford MD                History of laparoscopic adjustable gastr

ic banding 

(Primary Dx); 

Essential hypertension; 

Coronary artery disease involving native heart, angina presence unspecified, 
unspecified vessel or lesion type; 

Type 2 diabetes mellitus without complication, with long-term current use of 
insulin (HCC); 

Obstructive sleep apnea syndrome



                     10/24/2019          Office Visit        General Surgery  



after 2019



Family History





   



                 Medical History  Relation        Name            Comments

 

   



                           Alcohol abuse             Father  

 

   



                           Heart attack              Father  

 

   



                           Heart disease             Father  

 

   



                           Alcohol abuse             Mother  

 

   



                           Aneurysm                  Mother  

 

   



                           Diabetes                  Mother  

 

   



                           Cancer                    Sister  







   



                 Relation        Name            Status          Comments

 

   



                           Father                     

 

   



                           Mother                     

 

   



                                         Sister   







Social History





                                        Date



                 Tobacco Use     Types           Packs/Day       Years Used 

 

                                         



                                         Never Smoker    

 

    



                                         Smokeless Tobacco: Never   



                                         Used   







                    Drinks/Week         oz/Week             Comments



                                         Alcohol Use   

 

                                                             



                                         No   







  



                     Alcohol Habits      Answer              Date Recorded

 

  



                     How often do you have a drink containing alcohol?  Never   

            2019

 

  



                           How many drinks containing alcohol do you have on  No

t asked 



                                         a typical day when you are drinking?  

 

  



                           How often do you have six or more drinks on one  Not 

asked 



                                         occasion?  







 



                           Sex Assigned at Birth     Date Recorded

 

 



                                         Not on file 







Last Filed Vital Signs





                    Reading             Time Taken          Comments



                                         Vital Sign   

 

                    185/95              2020  9:25 AM CST  



                                         Blood Pressure   

 

                    78                  2020  9:25 AM CST  



                                         Pulse   

 

                    36.7 C (98 F)   2020  9:25 AM CST  



                                         Temperature   

 

                    20                  2020 12:28 PM CST  



                                         Respiratory Rate   

 

                    95%                 2020 12:28 PM CST  



                                         Oxygen Saturation   

 

                    -                   -                    



                                         Inhaled Oxygen   



                                         Concentration   

 

                    74.8 kg (165 lb)    2020 11:16 AM CST  



                                         Weight   

 

                    152.4 cm (5')       2020 11:16 AM CST  



                                         Height   

 

                    32.22               2020 11:16 AM CST  



                                         Body Mass Index   







Plan of Treatment





   



                 Health Maintenance  Due Date        Last Done       Comments

 

   



                           DIABETIC RETINAL EYE EXAM  1950  

 

   



                           DIABETIC FOOT EXAM        1961  

 

   



                           URINE MICROALBUMIN        1961  

 

   



                           BREAST CANCER SCREENING   2001  

 

   



                           COLONOSCOPY SCREENING     2001  

 

   



                           SHINGLES VACCINES (#1)    2001  

 

   



                           65+ PNEUMOCOCCAL VACCINE  2016  



                                         (1 of 1 - PPSV23)   

 

   



                     INFLUENZA VACCINE   2020          10/16/2007 







Procedures





                                        Comments



                 Procedure Name  Priority        Date/Time       Associated Diag

nosis 

 

                                         



                 AMB REFERRAL TO  WEIGHT  Routine         2020      Obes

ity (BMI 30.0-34.9) 



                     MANAGEMENT - MEDICAL   12:56 PM CST        History of remov

al of 



                           NUTRITION THERAPY         laparoscopic gastric 



                                         banding device 

 

                                        



Results for this procedure are in the results section.



                     POC GLUCOSE         Routine             2020  



                                         11:22 AM CST  

 

                                        



Results for this procedure are in the results section.



                     SURGICAL PATHOLOGY  Routine             2020  



                           REQUEST                   9:23 AM CST  

 

                                        



Results for this procedure are in the results section.



                     NM AN ELECTIVE      Routine             2020  



                           ENDOTRACHEAL AIRWAY       8:55 AM CST  

 

                                         



                     REMOVAL, GASTRIC BAND,   2020          Other complica

tions of 



                     LAPAROSCOPIC        8:17 AM CST         gastric band proced

ure 



                                         Hx of laparoscopic 



                                         adjustable gastric 



                                         banding 



                                         Gastroesophageal reflux 



                                         disease 

 

                                        



Results for this procedure are in the results section.



                     POC GLUCOSE         Routine             2020  



                                         7:32 AM CST  

 

                                        



Results for this procedure are in the results section.



                 ECG 12-LEAD     Routine         2020      Preop testing 



                                         2:11 PM CST  

 

                                        



Results for this procedure are in the results section.



                 HEMOGLOBIN A1C  Routine         2020      Preop testing 



                                         2:00 PM CST  

 

                                        



Results for this procedure are in the results section.



                 HC COMPLETE BLD COUNT  Routine         2020      Preop te

sting 



                           W/AUTO DIFF               2:00 PM CST  

 

                                        



Results for this procedure are in the results section.



                 TYPE AND SCREEN  Routine         2020      Preop testing 



                                         1:56 PM CST  

 

                                        



Results for this procedure are in the results section.



                     ESTIMATED GFR       Routine             2020  



                                         12:59 PM CST  

 

                                        



Results for this procedure are in the results section.



                 BASIC METABOLIC PANEL  Routine         2020      Preop te

sting 



                                         12:59 PM CST  

 

                                        



Results for this procedure are in the results section.



                 XR CALCANEUS 2+ VW RIGHT  Routine         2019      [facility]

nd's deformity, 



                           10:07 AM CST              right 

 

                                        



Results for this procedure are in the results section.



                 ZZFL UGI W KUB  Routine         2019      History of lapa

roscopic 



                           7:35 AM CST               adjustable gastric 



                                         banding 



                                         Essential hypertension 



                                         Coronary artery disease 



                                         involving native heart, 



                                         angina presence 



                                         unspecified, unspecified 



                                         vessel or lesion type 



after 2019



Results

* Ambulatory referral to  Weight Management - Medical Nutrition Therapy 
  (2020 12:56 PM CST)

* POC glucose (2020 11:22 AM CST)



Only the most recent of 2 results within the time period is included.



    



              Component    Value        Ref Range    Performed At  Pathologist



                                         Signature

 

    



                 POC glucose     207 (H)         65 - 99 mg/dL   [place] 



                           Comment:                  [employer] 



                            Name: [facility]   



                                         Device ID: [id number]   











                                         Specimen

 









   



                 Performing Organization  Address         City/State/ZIP Code  P

maude Number

 

   



                     [facility] DEPARTMENT   [id number] [place] [address]    [place], 

[address] 



                                         PATHOLOGY AND GENOMIC   



                                         MEDICINE   

 

   



                     [place] [facility]  [address] [place] [address]    [place]

 [address] 



                                         [facility]   





* Surgical pathology request (2020  9:23 AM CST)



    



              Component    Value        Ref Range    Performed At  Pathologist



                                         Signature

 

    



                     Case number         [id number]        [facility] 



                                         DEPARTMENT OF 



                                         PATHOLOGY AND 



                                         GENOMIC 



                                         MEDICINE 

 

    



                     Surgical            See link below for PDF Lab   [facility] 



                     pathology           Report              DEPARTMENT OF 



                           report                    PATHOLOGY AND 



                                         GENOMIC 



                                         MEDICINE 

 

    



                     Result status       This is Final Report for   [facility] 



                           [id number]              DEPARTMENT OF 



                                         PATHOLOGY AND 



                                         GENOMIC 



                                         MEDICINE 











                                         Specimen

 









   



                 Performing Organization  Address         City/[facility] Code  P

maude Number

 

   



                     [address]    [place], 

[address] 



                                         PATHOLOGY AND GENOMIC   



                                         MEDICINE   





* Airway (2020  8:55 AM CST)



 



                           Narrative                 Performed At

 

 



                                         Earnest Emmanuel   2020

 8:55 AM 



                                         Airway 



                                         Performed by: Earnest Emmanuel 



                                         Authorized by: Atif Larson MD 



                                         Location: OR 



                                         Urgency: Elective 



                                         Difficult Airway: No  



                                         Anesthesiologist: Atif Larson M D 



                                         Resident/CRNA/AA: Earnest Emmanuel 



                                         Performed by: 



                                          resident/CRNA/AA 



                                         Preoxygenated with 100% O2: Yes  



                                         C-spine Precautions Maintained Througho

ut: Yes  



                                         Mask Ventilation: Easy mask 



                                         Final Airway Type: Endotracheal airwa

y 



                                         Final Endotracheal Airway: ETT 



                                         Cuffed: Yes  



                                         Technique Used: Direct laryngoscopy 



                                         Devices/Methods Used in Placement: In

tubating stylet 



                                         Insertion Site: Oral 



                                         Blade Type: Staci 



                                         Laryngoscope Blade/Videolaryngoscope Bl

ibis Size: 3 



                                         ETT Size (mm): 7.0 



                                         Cuff at minimum occlusion pressure: Yes

  



                                         Measured from: Lips 



                                         ETT to Lips (cm): 21 



                                         Placement Verified by: CO2 detection, d

irect visualization and equal 



                                         breath sounds  



                                         Laryngoscopic view: Grade IIb - view 

of arytenoids or posterior of 



                                         glottis only 



                                         Rapid Sequence Induction (RSI): No  



                                         Modified RSI: No  



                                         Number of Attempts at Approach: 1 





* ECG 12 lead (2020  2:11 PM CST)



    



              Component    Value        Ref Range    Performed At  Pathologist



                                         Signature

 

    



                     Ventricular         94                  [facility] 



                                         rate    

 

    



                     Atrial rate         94                  [facility] 

 

    



                     NM interval         198                 [facility] 

 

    



                     QRSD interval       94                  [facility] 

 

    



                     QT interval         380                 [facility] 

 

    



                     QTC interval        475                 [facility] 

 

    



                     P axis 1            58                  [facility] 

 

    



                     QRS axis 1          38                  [facility] 

 

    



                     T wave axis         85                  [facility] 

 

    



                     EKG impression      Normal sinus rhythm-Septal   [facility] 



                                         infarct (cited on or before   



                                         26-MAR-2019)-Abnormal ECG-In   



                                         automated comparison with ECG   



                                         of 26-MAR-2019 11:59,-Vent.   



                                         rate has increased BY 36   



                                         BPM-QT has   



                                         lengthened-Electronically   



                                         Signed By Christina Vasquez MD   



                                         ([id number]) on 2020   



                                         2:40:55 PM   











                                         Specimen

 









 



                           Narrative                 Performed At

 

 



                                         This result has an attachment that is n

ot available. 







   



                 Performing Organization  Address         City/[facility] Code  P

maude Number

 

   



                     [facility] MUSE            [address] [place] [address] 





* CBC with platelet and differential (2020  2:00 PM CST)



    



              Component    Value        Ref Range    Performed At  Pathologist



                                         Signature

 

    



                 WBC             5.23            4.50 - 11.00 k/uL  [facility] 

 

    



                 RBC             4.21            4.20 - 5.50 m/uL  [facility] 

 

    



                 HGB             12.2            12.0 - 16.0 g/dL  [facility] 

 

    



                 HCT             37.7            37.0 - 47.0 %   [facility] 

 

    



                 MCV             89.5            82.0 - 100.0 fL  [facility] 

 

    



                 MCH             29.0            27.0 - 34.0 pg  [facility] 

 

    



                 MCHC            32.4            31.0 - 37.0 g/dL  [facility] 

 

    



                 RDW - SD        46.3            37.0 - 55.0 fL  [facility] 

 

    



                 MPV             11.5            8.8 - 13.2 fL   [facility] 

 

    



                 Platelet count  199             150 - 400 k/uL  [facility] 

 

    



                 Nucleated RBC   0.00            /100 WBC        [facility] 

 

    



                 Neutrophils     72.8 (H)        39.0 - 69.0 %   [facility] 

 

    



                 Lymphocytes     15.3 (L)        25.0 - 45.0 %   [facility] 

 

    



                 Monocytes       8.4             0.0 - 10.0 %    [facility] 

 

    



                 Eosinophils     2.3             0.0 - 5.0 %     [facility] 

 

    



                 Basophils       0.8             0.0 - 1.0 %     [facility] 











                                         Specimen

 





                                         Blood







   



                 Performing Organization  Address         City/State/ZIP Code  P

maude Number

 

   



                     [facility] DEPARTMENT [address]    [place] [address] 



                                         PATHOLOGY AND [facility] [address]   



                                         MEDICINE   

 

   



                     [address]    [address]   





* Hemoglobin A1c (2020  2:00 PM CST)



    



              Component    Value        Ref Range    Performed At  Pathologist



                                         Signature

 

    



                 Hemoglobin A1C  7.0 (H)         4.0 - 5.6 %     [place] 



                           Comment:                  [facility] 



                           HbA1c cutoffs for diagnosing   [facility] 



                                         diabetes:   



                                         4.0% - 5.6% = normal   



                                         5.7% - 6.4% = increased risk   



                                         for diabetes (prediabetes)9   



                                         >=6.5% = diabetes9   



                                         Goals for glycemic control   



                                         (ADA 2016)   



                                         < 7.0% Target for   



                                         nonpregnant adults with   



                                         diabetes.   



                                         More or less stringent targets   



                                         may be appropriate for   



                                         individual patients.   



                                         <7.5%  Target for Children   



                                         and adolescents with type 1   



                                         diabetes.   











                                         Specimen

 





                                         Blood







   



                 Performing Organization  Address         City/[facility]/ZIP Code  P

maude Number

 

   



                     [address]    [place] [address] 



                                         PATHOLOGY AND [facility] [address]   



                                         MEDICINE   

 

   



                     [address]    [address]   





* Type and screen (2020  1:56 PM CST)



    



              Component    Value        Ref Range    Performed At  Pathologist



                                         Signature

 

    



                     ABO grouping        O                   [facility] 

 

    



                     Rh type             NEG                 [facility] 

 

    



                     Antibody screen     NEG                 [place] 



                           (gel)                     [facility] 











                                         Specimen

 





                                         Blood







   



                 Performing Organization  Address         City/State/ZIP Code  P

maude Number

 

   



                     [facility] DEPARTMENT [address]    [place] [address] 



                                         PATHOLOGY AND [facility] [address]   



                                         MEDICINE   

 

   



                     [address]    [address]   





* Estimated GFR (2020 12:59 PM CST)



    



              Component    Value        Ref Range    Performed At  Pathologist



                                         Signature

 

    



                 Estimated GFR   58 (A)          mL/min/1.73 m2  [place] 



                           Comment:                  [employer] 



                           [facility] 



                                         Interpretation   



                                         G1     >=90     



                                         Normal or high   



                                         G2     60-89    



                                         Mildly decreased   



                                         G3a    45-59    



                                         Mildly to moderately   



                                         decreased   



                                         G3b    30-44    



                                         Moderately to severely   



                                         decreased   



                                         G4     15-29    



                                         Severely decreased   



                                         G5     <15     



                                         Kidney failure   



                                         The eGFR was calculated using   



                                         the Chronic Kidney Disease   



                                         Epidemiology Collaboration   



                                         (CKD-EPI) equation.   



                                         Interpretation is based on   



                                         recommendations of the   



                                         National Kidney   



                                         Foundation-Kidney Disease   



                                         Outcomes Quality   



                                         Initiative (NKF-KDOQI)   



                                         published in 2014.   











                                         Specimen

 





                                         Plasma specimen







   



                 Performing Organization  Address         City/[facility]/ZIP Code  P

maude Number

 

   



                     [facility] DEPARTMENT OF  [address] [place] [address]    [place], 

[address] 



                                         PATHOLOGY AND GENOMIC   



                                         MEDICINE   

 

   



                     [address]    [place] [id number] 



                                         [facility]   





* Basic metabolic panel (2020 12:59 PM CST)



    



              Component    Value        Ref Range    Performed At  Pathologist



                                         Signature

 

    



                 Sodium          140             135 - 148 mEq/L  [facility] 

 

    



                 Potassium       4.7             3.5 - 5.0 mEq/L  [facility] 

 

    



                 Chloride        101             98 - 112 mEq/L  [facility] 

 

    



                 CO2             23 (L)          24 - 31 mEq/L   [facility] 

 

    



                 Anion gap       16@ANIO (H)     7 - 15 mEq/L    [facility] 

 

    



                 BUN             31 (H)          8 - 23 mg/dL    [facility] 

 

    



                 Creatinine      1.00 (H)        0.50 - 0.90 mg/dL  [facility] 

 

    



                 Glucose         383 (H)         65 - 99 mg/dL   [facility] 

 

    



                 Calcium         10.8 (H)        8.8 - 10.2 mg/dL  [facility] 











                                         Specimen

 





                                         Plasma specimen







   



                 Performing Organization  Address         City/[facility]/ZIP Code  P

maude Number

 

   



                     [facility] DEPARTMENT [address]    [place], 

[address] 



                                         PATHOLOGY AND GENOMIC   



                                         MEDICINE   

 

   



                     [address]    [address]   





* XR Calcaneus 2+ Vw Right (2019 10:07 AM CST)







                                         Specimen

 









 



                           Narrative                 Performed At

 

 



                           This result has an attachment that is not available. 

 [facility]



                                         Status post Quang surgery. No repea

t calcifications are noted.  



                                         Swelling of the Achilles tendon is appr

eciated but improved. 







   



                 Performing Organization  Address         City/[facility] Code  P

maude Number

 

   



                     [facility]          [address] [place] [address] 





* FL UGI W KUB (2019  7:35 AM CST)







                                         Specimen

 









 



                           Narrative                 Performed At

 

 



                           EXAMINATION: FL UGI W KUB  [facility]



                                         CLINICAL HISTORY: Z98.84 Bariatric jerry

rgery status, I10 Essential (primary) 



                                         hypertension, Esophageal reflux 



                                         COMPARISON: None. 



                                         TECHNIQUE: UPPER GI SERIES was perfor

med with barium. 



                                         FLUOROSCOPIC TIME: 1 minute 12 second

s 



                                         Total number of fluoroscopic images: 22

 



                                         IMPRESSION: 



                                          radiograph of the abdomen demonst

rates an indwelling gastric lap band. 

The 



                                         Phi angle is 37 degrees. The subcutaneo

us port reservoir has been 



                                         disconnected/removed. 



                                         Esophagus demonstrates normal contour, 

distention and motility. 



                                         There is no hiatal hernia. There is no 

delay of contrast passage across the lap





                                         band into the stomach, no significant n

arrowing/stricture of the stomach at the





                                         level of the band is seen. This suggest

s that the band reservoir is 



                                         decompressed. 



                                         Below the band, gastric contour and dis

tention are normal. There is normal 



                                         emptying into the duodenum. 



                                         No gastroesophageal reflux was observed

. 



                                         [id number] 







                                        Procedure Note

 

                                        



Hm Interface, Radiology Results Incoming - 2019 10:06 AM CST



EXAMINATION:  FL UGI W KUB



CLINICAL HISTORY:  Z98.84 Bariatric surgery status, I10 Essential (primary) 
hypertension, Esophageal reflux



COMPARISON:  None.



TECHNIQUE:  UPPER GI SERIES was performed with barium.



FLUOROSCOPIC TIME:  1 minute 12 seconds

Total number of fluoroscopic images: 22



IMPRESSION:

 radiograph of the abdomen demonstrates an indwelling gastric lap band. The
Phi angle is 37 degrees. The subcutaneous port reservoir has been 
disconnected/removed.



Esophagus demonstrates normal contour, distention and motility.



There is no hiatal hernia. There is no delay of contrast passage across the lap 
band into the stomach, no significant narrowing/stricture of the stomach at the 
level of the band is seen. This suggests that the band reservoir is 
decompressed.



Below the band, gastric contour and distention are normal. There is normal 
emptying into the duodenum.



No gastroesophageal reflux was observed.







[id number]











   



                 Performing Organization  Address         City/State/ZIP Code  P

maude Number

 

   



                     [facility]          [address] [place] [address] 





after 2019



Insurance





                                        Type



            Payer      Benefit    Subscriber ID  Effective  Phone      Address 



                           Plan /                    Dates   



                                         Group     

 

                                        Medicare



              MEDICARE     MEDICARE     [id number]  2016-   [place], 



                     PART A AND          Present             [place] 



                                         B     

 

                                        Commercial



                 [employer]  [employer]       [id number]       2017-KRISTIE baldwin   







     



            Guarantor Name  Account    Relation to  Date of    Phone      [place]illin

g Address



                     Type                Patient             Birth  

 

     



            Lorena Morejon  Personal/F  Self       1950  [phone]

[address] 

[facility]               ([place])              [place] [address]







Advance Directives





For more information, please contact: [phone]







                          Patient Representative    Explanation



                           Type                      Date Recorded  

 

                                                     



                                         Advance Directives,   



                                         Living Will and   



                                         Medical Power of

## 2020-09-27 NOTE — XMS REPORT
Continuity of Care Document

                             Created on: 2020



SUPRIYA MOULTON

External Reference #: [id number]

: 1950

Sex: Female



Demographics





                          Address                   [address] [place]  [address]

 

                          Home Phone                [phone]

 

                          Preferred Language        English

 

                          Marital Status            Unknown

 

                          Church Affiliation     Unknown

 

                          Race                      Unknown

 

                                        Additional Race(s) 



White



 

                          Ethnic Group              Unknown





Author





                          Author                    [facility]

t

 

                          Organization              [facility]

 

                          Address                   [address]

[place]  [address]



 

                          Phone                     Unavailable







Support





                Name            Relationship    Address         Phone

 

                Luc Moulton ECON            Unknown         [phone]







Care Team Providers





                    Care Team Member Name Role                Phone

 

                    Brandi SILVA, Eli Garza PCP                 [phone]

 

                    PHYLLIS NUÑEZ Attphys             Unavailable

 

                    ESTEVAN MARIEE P.AJuve Attphys             Unavailable

 

                    THIAGO LIRIANO M.D. Attphys             Unavailable

 

                    Yunior Stanford MD Attphys             [phone]

 

                    PHYLLIS Rodriguez RD Attphys             Unavailable

 

                    Kyle KAPOOR, PEMA Sanders Attphys             Unavailable

 

                    NACHO Larson MD Attphys             [phone]

 

                    Verna Emmanuel Attphys             [phone]

 

                    Fahad SILVA, Atif Farnsworth Attphys             [phone]

 

                    Christina Colon MA  Attphys             Unavailable

 

                    TRACIE UNDERWOOD, APRN   Attphys             Unavailable

 

                    MAYNOR DOGN   Attphys             Unavailable

 

                    LION WHARTON APRN  Attphys             Unavailable

 

                    CYNTHIA TALBOT NP Attphys             Unavailable

 

                    LUKE KIMBALL P.A. Attphys             Unavailable

 

                    LUCAS XAVIER M.D. Attphys             Unavailable

 

                    SERGEI ZAVALA NP Attphys             Unavailable

 

                    TRACIE UNDERWOOD NP     Attphys             Unavailable

 

                    MARIO JEAN M.D. Attphys             Unavailable

 

                    PENNY HE P.A. Attphys             Unavailable

 

                    SIMON CARR NP    Attphys             Unavailable

 

                    LUCERO STANFORD     Admphys             Unavailable







Payers





           Payer Name Policy Type Policy Number Effective Date Expiration Date S

ource MEDICAREMEDICARE PART A AND [id number]                     

[id number]         2016 00:00:00                     Kal Rdz

 

                [place] OF [place] OF [id number]

l                 [id number]       

2017 00:00:00                                 Kal Rdz







Problems





           Condition Name Condition Details Condition Category Status     Onset 

Date Resolution

Date            Last Treatment Date Treating Clinician Comments        Source

 

                          History of removal of laparoscopic gastric banding dev

ice History of removal of 

laparoscopic gastric banding device Disease Active  2020 00:00:00         

                        

Kal Rdz

 

                    GERD (gastroesophageal reflux disease) GERD (gastroesophagea

l reflux disease) 

Disease   Active    2019-10-24 00:00:00                                         

Kal Rdz

 

                          History of laparoscopic adjustable gastric banding His

tory of laparoscopic 

adjustable gastric banding Disease Active  2019-10-24 00:00:00                  

               Kal Rdz

 

       Hypertension Hypertension Disease Active 2019-10-24 00:00:00             

                Kal Rdz

 

       Hyperlipidemia Hyperlipidemia Disease Active 2019-10-24 00:00:00         

                    Kal Rdz

 

             CAD (coronary artery disease) CAD (coronary artery disease) Disease

      Active       

2019-10-24 00:00:00                                                     Kal Rdz

 

       Diabetes mellitus Diabetes mellitus Disease Active 2019-10-24 00:00:00   

                          

Kal Rdz

 

                Obstructive sleep apnea syndrome Obstructive sleep apnea syndrom

e Disease         Active

           2019-10-24 00:00:00                                             [place]

on [facility]

 

             Quang's deformity, right Quang's deformity, right Disease      

Active       2019 

00:00:00                                                         Kal Hoyos

st

 

                          History of Diabetes Mellitus Without Complication Hist

ory of Diabetes Mellitus 

Without Complication Problem Resolved                                         Un

[facility]

 

       History of cough History of cough Problem Resolved                       

             [facility]

[facility]

 

       History of snoring History of snoring Problem Resolved                   

                 [facility]

 

                          History of Abnormal finding on urinalysis History of A

bnormal finding on 

urinalysis Problem Resolved                                         [place] o

[facility]

 

                          History of Acute bronchitis due to infection History o

f Acute bronchitis due to 

infection Problem Resolved                                         [facility]

 

                          History of Acute drug-induced gout of foot, unspecifie

d laterality History of 

Acute drug-induced gout of foot, unspecified laterality Problem    Resolved     

                                

                                                    [facility]

ns

 

       Gout   Gout   Problem Active                                    [facility]

 

                          History of Acute maxillary sinusitis, recurrence not s

pecified History of Acute 

maxillary sinusitis, recurrence not specified Problem Resolved                  

                       

[facility]

 

                          History of Acute recurrent maxillary sinusitis History

 of Acute recurrent 

maxillary sinusitis Problem Resolved                                         [facility]

 

                          History of Acute upper respiratory infection History o

f Acute upper respiratory 

infection Problem Resolved                                         [facility]

 

       History of Acute UTI History of Acute UTI Problem Resolved               

                     [facility]

 

           History of contact dermatitis History of contact dermatitis Problem  

  Resolved               

                                                                [facility]

 

       History of hyperlipidemia History of hyperlipidemia Problem Resolved     

                               

[facility]

 

          Diabetes type 2, uncontrolled Diabetes type 2, uncontrolled Problem   

Active                         

                                                            [facility] 

[facility]

 

                History of essential hypertension History of essential hypertens

ion Problem         

Resolved                                                          [facility]

 

       History of hematuria History of hematuria Problem Resolved               

                     [facility]

 

                History of viral gastroenteritis History of viral gastroenteriti

s Problem         

Resolved                                                          [facility]

 

        History of Intercostal pain History of Intercostal pain Problem Resolved

                          

                                                    [facility]

ns

 

       Personal history of gout Personal history of gout Problem Resolved       

                             

[facility]

 

                History of myocardial infarction History of myocardial infarctio

n Problem         

Resolved                                                          [facility]

 

       Narcolepsy Narcolepsy Problem Active                                    [facility]

 

                History of Noninfectious diarrhea History of Noninfectious diarr

hea Problem         

Resolved                                                          [facility]

 

       History of Other fatigue History of Other fatigue Problem Resolved       

                             

[facility]

 

                    History of Pain, foot, left, chronic History of Pain, foot, 

left, chronic 

Problem   Resolved                                                    [facility]

 

        History of Right foot pain History of Right foot pain Problem Resolved  

                                

                                        [facility]

 

       History of sciatica History of sciatica Problem Resolved                 

                   [facility]

 

                          History of Sciatica of right side associated with diso

rder of lumbar spine 

History of Sciatica of right side associated with disorder of lumbar spine 

Problem   Resolved                                                    [facility]

 

       History of sinusitis History of sinusitis Problem Resolved               

                     [facility]

 

                    History of Swelling of foot joint, left History of Swelling 

of foot joint, left 

Problem   Resolved                                                    [facility]

 

       Edema  Edema  Problem Active                                    [facility]

 

       Amezcua neuroma, left Amezcua neuroma, left Problem Active                 

                   [facility]

 

       Post herpetic neuralgia Post herpetic neuralgia Problem Active           

                         

[facility]

 

        Bleeding risk due to aspirin Bleeding risk due to aspirin Problem Active

                           

                                                    [facility]

ns

 

       Hypertriglyceridemia Hypertriglyceridemia Problem Active                 

                   [facility]

 

       Other insomnia Other insomnia Problem Active                             

       [facility] 

[facility]

 

       Lumbar facet arthropathy Lumbar facet arthropathy Problem Active         

                           

[facility]

 

                          Lumbar adjacent segment disease with spondylolisthesis

 Lumbar adjacent segment 

disease with spondylolisthesis Problem Active                                   

       [facility] 

[facility]

 

                    Bulge of lumbar disc without myelopathy Bulge of lumbar disc

 without myelopathy 

Problem   Active                                                      [facility]

 

                          Antiplatelet or antithrombotic long-term use Antiplate

let or antithrombotic 

long-term use Problem Active                                          [facility]

 

       Vitamin D deficiency Vitamin D deficiency Problem Active                 

                   [facility]

 

       Hypercalcemia Hypercalcemia Problem Active                               

     [facility] 

[facility]

 

                Controlled type 2 diabetes mellitus Controlled type 2 diabetes m

ellitus Problem         

Active                                                            [facility]

 

                Essential (primary) hypertension Essential (primary) hypertensio

n Problem         Active

                                                                  [facility]

 

       Hyperlipidemia Hyperlipidemia Problem Active                             

       [facility] 

[facility]

 

       Depressive disorder Depressive disorder Problem Active                   

                 [facility]

 

       Acute sinusitis Acute sinusitis Problem Active                           

         [facility] 

[facility]

 

       Anxiety with depression Anxiety with depression Problem Active           

                         

[facility]

 

       Obesity (BMI 30.0-34.9) Obesity (BMI 30.0-34.9) Problem Active           

                         

[facility]

 

       Vaginitis Vaginitis Problem Active                                    [facility]







Allergies, Adverse Reactions, Alerts





        Allergy Name Allergy Type Status  Severity Reaction(s) Onset Date Inacti

ve Date 

Treating Clinician        Comments                  Source

 

           Codeine    Propensity to adverse reactions to drug Active            

    GI Intolerance 

2020 00:00:00                                 n/v             [place] [facility]

odist







Family History





           Family Member Diagnosis  Comments   Start Date Stop Date  Source

 

           Unknown Family Member Family history of Stroke Syndrome Family Histor

y                       

[facility]

 

           Grandmother Family history of Heart Disease                          

        [facility]

 

           Grandmother Family history of Diabetes Mellitus                      

            [facility] 

[facility]

 

           Grandmother Family history of Hypertension                           

       [facility]

 

           Grandmother Family history of Pure Hypercholesterolemia              

                    [facility]

 

           Mother     Family history of Heart Disease                           

       [facility]

 

           Mother     Family history of Diabetes Mellitus                       

           [facility]

 

           Mother     Family history of Hypertension                            

      [facility]

 

           Mother     Family history of Pure Hypercholesterolemia               

                   [facility] 

[facility]

 

           Father     Family history of Heart Disease                           

       [facility]

 

           Father     Family history of Hypertension                            

      [facility]

 

           Father     Family history of Pure Hypercholesterolemia               

                   [facility] 

[facility]

 

           Natural father Alcohol abuse                                  [place]

 [facility]

 

           Natural father Heart attack                                  [place] 

[facility]

 

           Natural father Heart disease                                  [place]

 [facility]

 

           Natural mother Alcohol abuse                                  [place]

 [facility]

 

           Natural mother Aneurysm                                    [place]

thodist

 

           Natural mother Diabetes                                    [place]

[facility]od[facility]

 

           Natural sister Cancer                                      [place]

[facility]od[facility]







Social History





           Social Habit Start Date Stop Date  Quantity   Comments   Source

 

           History SDOH Alcohol Std Drinks                                      

       [place] [facility]

 

           History SDOH Alcohol Binge                                           

  [place] [facility]

 

           Sex Assigned At Birth                                             Jasmeet nix [employer]

 

           Tobacco use and exposure 2020 00:00:00 2020 00:00:00 Annabel maya used            

[place] [employer]

 

                Alcohol intake  2020 00:00:00 2020 00:00:00 Current 

non-drinker of 

alcohol (finding)                                   Kal Rdz

 

           History SDOH Alcohol Frequency 2019 00:00:00 2019 00:00:0

0 1                     

Kal Rdz







                Smoking Status  Start Date      Stop Date       Source

 

                Ex-smoker (finding)                                 [facility]

 

                Never smoker                                    Kal Santoyo

t







Medications





             Ordered Medication Name Filled Medication Name Start Date   Stop Da

te    Current 

Medication? Ordering Clinician Indication Dosage     Frequency  Signature (SIG) 

Comments                  Components                Source

 

                          Nystatin 559728 UNIT/GM External Ointment Nystatin 100

000 UNIT/GM External 

Ointment 2020 00:00:00         Yes     ESTEVAN MARIEE P.A.                 

Q0.3333D APPLY 2-3 

TIMES DAILY TO AFFECTED AREA(S).                                         [facility]

 

                    Nystatin 844497 UNIT/GM External Powder Nystatin 615440 UNIT

/GM External Powder 

2020 00:00:00           Yes       ESTEVAN MARIEE P.A.                     Q

0.3333D  APPLY 2-3 TIMES DAILY 

TO AFFECTED AREA(S).                                         [facility]

 [facility]

 

                          Vitamin D (Ergocalciferol) 1.25 MG (48377 UT) Oral Cap

latricia Vitamin D 

(Ergocalciferol) 1.25 MG (95704 UT) Oral Capsule 2020 00:00:00            

     Yes             THIAGO LIRIANO M.D.                               take 1 cap PO once weekly             

        [facility]

 

             cholecalciferol, vitamin D3, (DIALYVITE VITAMIN D ORAL)            

  2020 11:22:59              

Yes                                     Take by mouth.                 Kal KUMAR

ethodi

 

                    insulin regular, human (NOVOLIN R REGULAR U-100 INSULN INJ) 

                    2020 

11:22:59         Yes                     20U     Q.5D    Inject 20 Units as dire

cted 2 (two) times a day.          

                                        Kal Rdz

 

       carvedilol (COREG) 3.125 MG tablet        2020 11:22:59        Yes 

                 25mg   Q.5D   

Take 25 mg by mouth 2 (two) times a day with meals.                             

             Kal Rdz

 

        aspirin (ECOTRIN) 81 MG enteric coated tablet         2020 11:22:5

9         Yes                     

81mg         QD           Take 81 mg by mouth daily.                           LAZARO Rdz

 

       atorvastatin (LIPITOR) 40 MG tablet        2020 11:22:59        Yes

                  40mg   QD     Take

40 mg by mouth daily.                                         Kal Rdz

 

       famotidine (PEPCID) 20 MG tablet        2020 11:22:59        Yes   

               20mg   Q.5D   Take 

20 mg by mouth 2 (two) times a day as needed for indigestion.                   

                       Kal Rdz

 

        nitroglycerin (NITROSTAT) 0.4 MG SL tablet         2020 11:22:59  

       Yes                     .4mg    

                          Place 0.4 mg under the tongue every 5 (five) minutes a

s needed for chest pain. 

                                                    Kal Rdz

 

       irbesartan (AVAPRO) 300 MG tablet        2020 11:22:59        Yes  

                300mg  QD     Take 

300 mg by mouth nightly.                                         Kal Hoyos

st

 

       UNABLE TO FIND        2020 13:24:28 2020 00:00:00 No         

                        Med Name: 

***                                                         Kal Rdz

 

                isosorbide dinitrate (ISORDIL) 40 MG tablet                 2020 06:52:43 2020 

00:00:00   No                               40mg       [id number] Ta

ke 40 mg by mouth 3 (three) times 

a day.                                                      Kal Rdz

 

                HYDROcodone-acetaminophen (NORCO) 5-325 mg per tablet           

      2020 00:00:00 

2020 23:59:00 No                    acute pain 1{tbl}     Q6H        Take 

1-2 tablets by mouth every 6

(six) hours as needed for moderate pain or severe pain for up to 30 days .acute 
pain.                                                       Kal Rdz

 

           modafinil (PROVIGIL) 100 MG tablet            2020 13:21:27  00:00:00 No         

                    100mg     QD        Take 100 mg by mouth daily.             

        Kal Rdz

 

                ibuprofen (ADVIL,MOTRIN) 800 MG tablet                 2019-10-2

4 12:12:43 2019-10-24 00:00:00

           No                               800mg      Q6H        Take 800 mg by

 mouth every 6 (six) hours as needed for mild 

pain.                                                       Kal Rdz

 

          fexofenadine HCl (ALLEGRA ORAL)           2019-10-24 12:12:39 2019-10-

24 00:00:00 No                   

                                 Take by mouth.                       Kal avitia

 

        metformin HCl (METFORMIN ORAL)         2019-10-24 12:12:27 2019-10-24 00

:00:00 No                      

1000mg       Q.5D         Take 1,000 mg by mouth 2 (two) times a day.           

                 Kal Rdz

 

                          ReliOn Insulin Syringe 31G X 15/64" 0.3 ML ReliOn Insu

ronny Syringe 31G X 15/64" 

0.3 ML 2019 00:00:00        Yes    THIAGO LIRIANO M.D.                      

use to inject 3-5xs         

                                        [facility]

 

                          NovoLIN R ReliOn 100 UNIT/ML Injection Solution NovoLI

N R ReliOn 100 UNIT/ML 

Injection Solution 2019 00:00:00         Yes     THIAGO LIRIANO M.D.        

                 inject 5-10

U SC q1/2AC                                                 [facility] 

[facility]

 

          ondansetron (ZOFRAN) 4 MG tablet           2019 00:00:00 2019-10

-24 00:00:00 No                  

Quang's deformity, right 4mg                 Q8H                 Take 1 tablet

 (4 mg total) by mouth every 8 

(eight) hours as needed for nausea or vomiting.                                 

        Kal Rdz

 

           irbesartan (AVAPRO) 300 MG tablet            2019 00:00:00 2019

-10-24 00:00:00 No          

                    300mg     QD        Take 300 mg by mouth daily.             

        Kal Rdz

 

       clopidogrel (PLAVIX) 75 mg tablet        2019 00:00:00        Yes  

                75mg   QD     Take 

75 mg by mouth daily.                                         Kal Rdz

 

             allopurinol (ZYLOPRIM) 300 MG tablet              2018 00:00:

00 2019-10-24 00:00:00 

No                      300mg           300 mg.                 Kal Santoyo

t

 

           fenofibrate (TRICOR) 145 MG tablet            2018 00:00:00 201

9-10-24 00:00:00 No         

                                        TAKE ONE (1) TABLET(S) BY MOUTH DAILY.  

                   Kal BallardOn Blood Glucose Test In Vitro Strip ReliOn Blood 

Glucose Test In Vitro 

Strip   2018 00:00:00         Yes     THIAGO LIRIANO M.D.                 Q0

.5D   use to check BG at 

least 3xs daily                                             [facility] 

[facility]

 

                          NovoLIN N ReliOn 100 UNIT/ML Subcutaneous Suspension N

ovoLIN N ReliOn 100 

UNIT/ML Subcutaneous Suspension 2018 00:00:00           Yes       THIAGO PAT M.D.                     

                inject 20 U in AM and 24 U in PM SC                             

    [facility]

 

                    D 5000 125 MCG (5000 UT) Oral Capsule D 5000 125 MCG (5000 U

T) Oral Capsule 

2017-10-17 00:00:00        Yes    THIAGO LIRIANO M.D.                      take 1 

tab daily               

[facility]

 

                    Allopurinol 300 MG Oral Tablet Allopurinol 300 MG Oral Table

t 2017-10-06 

00:00:00        Yes    LION HOLLINGSWORTH        1      QD     TAKE 1 TABLET DAILY 

AS DIRECTED.               

[facility]

 

                Irbesartan 300 MG Oral Tablet Irbesartan 300 MG Oral Tablet 2013 00:00:00 

        Yes     ESTEVAN NEGRETEOS P.A.         1       QD      TAKE 1 TABLET DAILY. 

                [facility] 

[facility]

 

                    metFORMIN HCl - 500 MG Oral Tablet metFORMIN HCl - 500 MG Or

al Tablet 2013

00:00:00         Yes     THIAGO LIRIANO M.D.                 Q0.5D   TAKE 2 TABLET

S BY MOUTH TWICE DAILY  

                                                    [facility]

 

     Aspirin 81 MG TABS Aspirin 81 MG TABS           Yes            1    QD   TA

KE 1 TABLET DAILY.           

[facility]

 

                    Clopidogrel Bisulfate 75 MG Oral Tablet Clopidogrel Bisulfat

e 75 MG Oral Tablet 

              Yes                  1      QD     TAKE 1 TABLET DAILY.           

    [facility]

 

     Allegra CAPS Allegra CAPS           Yes                      as needed     

      [facility] 

[facility]

 

     Ibuprofen TABS Ibuprofen TABS           Yes                                

     [facility] 

[facility]

 

       Carvedilol 12.5 MG Oral Tablet Carvedilol 12.5 MG Oral Tablet            

   Yes                                2 

tabs BID                                                    [facility] 

[facility]

 

       Fenofibrate 145 MG Oral Tablet Fenofibrate 145 MG Oral Tablet            

   Yes                         QD     

TAKE 1 TABLET DAILY WITH FOOD.                                         [facility]







Immunizations





           Ordered Immunization Name Filled Immunization Name Date       Status 

    Comments   Source

 

           Influenza             2007-10-16 00:00:00 Completed             [facility]

 

                Fluzone High-Dose 0.5 ML Intramuscular Suspension Prefilled Syri

nge                 Unknown         

Completed                                           [facility]

ns

 

           Shingrix 50 MCG Intramuscular Suspension Reconstituted            Unk

nown    Completed             

[facility]







Vital Signs





             Vital Name   Observation Time Observation Value Comments     Source

 

                Systolic blood pressure 2020 10:52:00 148 mm[Hg]      Loca

tion: LUE; Position: 

Sitting                                 [facility]

 

                Diastolic blood pressure 2020 10:52:00 72 mm[Hg]       Loc

ation: LUE; Position: 

Sitting                                 [facility]

 

             Body height  2020 10:52:00 60 [in_us]                [facility]

 

             Weight       2020 10:52:00 172.4375 [lb_av]              [facility]

 

             Body mass index (BMI) [Ratio] 2020 10:52:00 33.68 kg/m2      

         [facility]

 

             Body temperature 2020 10:52:00 99 [degF]    Method: Temporal 

[facility]

 

             Heart Rate   2020 10:52:00 89 /min                   [facility]

 

             Respiratory rate 2020 10:52:00 16 /min                   [facility]

 

                Systolic blood pressure 2020 11:27:00 167 mm[Hg]      Loca

tion: LUE; Position: 

Sitting                                 [facility]

 

                Diastolic blood pressure 2020 11:27:00 89 mm[Hg]       Loc

ation: LUE; Position: 

Sitting                                 [facility]

 

             Heart Rate   2020 11:27:00 69 /min      Location: L Radial; Q

uality: Normal 

[facility]

 

                Systolic blood pressure 2020 11:24:00 172 mm[Hg]      Loca

tion: LUE; Position: 

Sitting                                 [facility]

 

                Diastolic blood pressure 2020 11:24:00 70 mm[Hg]       Loc

ation: LUE; Position: 

Sitting                                 [facility]

 

             Heart Rate   2020 11:24:00 69 /min      Location: L Radial; Q

uality: Normal 

[facility]

 

             Body height  2020 11:24:00 60 [in_us]                [facility]

 

             Weight       2020 11:24:00 169.5625 [lb_av]              [facility]

 

             Body mass index (BMI) [Ratio] 2020 11:24:00 33.12 kg/m2      

         [facility]

 

             Body temperature 2020 11:24:00 97.8 [degF]  Method: Oral [facility]

 

             Body height  2020 11:16:00 152.4 cm                  [place] 

[facility]

 

             Body weight  2020 11:16:00 74.844 kg                 [place] 

[facility]

 

             BMI          2020 11:16:00 32.22 kg/m2               [place] 

[facility]

 

                Systolic blood pressure 2020 10:00:00 123 mm[Hg]      Loca

tion: LUE; Position: 

Sitting                                 [facility]

 

                Diastolic blood pressure 2020 10:00:00 68 mm[Hg]       Loc

ation: LUE; Position: 

Sitting                                 [facility]

 

             Body height  2020 10:00:00 60 [in_us]                [facility]

 

             Weight       2020 10:00:00 162.5625 [lb_av]              [facility]

 

             Body mass index (BMI) [Ratio] 2020 10:00:00 31.75 kg/m2      

         [facility]

 

             Heart Rate   2020 10:00:00 69 /min                   [facility]

 

                Systolic blood pressure 2020 10:13:00 158 mm[Hg]      Loca

tion: LUE; Position: 

Sitting                                 [facility]

 

                Diastolic blood pressure 2020 10:13:00 82 mm[Hg]       Loc

ation: LUE; Position: 

Sitting                                 [facility]

 

             Body height  2020 10:13:00 60 [in_us]                [facility]

 

             Weight       2020 10:13:00 167.375 [lb_av]              [facility]

 

             Body mass index (BMI) [Ratio] 2020 10:13:00 32.69 kg/m2      

         [facility]

 

             Body temperature 2020 10:13:00 98.4 [degF]  Method: Oral [facility]

 

             Heart Rate   2020 10:13:00 69 /min      Location: L Brachial 

Artery;  [facility]

 

             O2 SAT       2020 10:13:00 97 %         Source: RA   [facility]

 

             Systolic blood pressure 2020 09:25:00 185 mm[Hg]             

   [place] [facility]

 

             Diastolic blood pressure 2020 09:25:00 95 mm[Hg]             

    [place] [facility]

 

             Heart rate   2020 09:25:00 78 /min                   [place] 

[facility]

 

             Body temperature 2020 09:25:00 36.67 Carolyn                 [place] [facility]

 

             Respiratory rate 2020 12:28:00 20 /min                   [place]

[place] [facility]

 

                    Oxygen saturation in Arterial blood by Pulse oximetry  12:28:00 95 

/min                                                [place] [facility]

 

             BP Systolic  2019 10:14:00 150 mm[Hg]   Location: SKYLA; Positi

on: Sitting 

[facility]

 

             BP Diastolic 2019 10:14:00 75 mm[Hg]    Location: LUE; [place]

on: Sitting 

[facility]

 

             Height       2019 10:14:00 60 [in_us]                [facility]

 

             Weight       2019 10:14:00 163.25 [lb_av]              [facility]

 

             Body Mass Index Calculated 2019 10:14:00 31.88 kg/m2         

      [facility]

 [facility]

 

             Heart Rate   2019 10:14:00 51 /min                   [facility]

 

             BP Systolic  2019 09:34:00 124 mm[Hg]   Location: LUE; Pos[place]

on: Sitting 

[facility]

 

             BP Diastolic 2019 09:34:00 63 mm[Hg]    Location: LUE; [place]

on: Sitting 

[facility]

 

             Height       2019 09:34:00 60 [in_us]                [facility]

 

             Weight       2019 09:34:00 160.125 [lb_av]              [facility]

 

             Body Mass Index Calculated 2019 09:34:00 31.27 kg/m2         

      [facility]

 [facility]

 

             Heart Rate   2019 09:34:00 71 /min                   [facility]

 

             BP Systolic  2019 09:31:00 135 mm[Hg]   Location: KERRYE; Pos[place]

on: Sitting 

[facility]

 

             BP Diastolic 2019 09:31:00 70 mm[Hg]    Location: LUE; [place]

on: Sitting 

[facility]

 

             Height       2019 09:31:00 60 [in_us]                [facility]

 

             Weight       2019 09:31:00 158.0 [lb_av]              [facility]

 

             Body Mass Index Calculated 2019 09:31:00 30.86 kg/m2         

      [facility]

 [facility]

 

             Heart Rate   2019 09:31:00 63 /min                   [facility]

 

             BP Systolic  2019 10:47:00 130 mm[Hg]   Location: LUE; [place]

on: Sitting 

[facility]

 

             BP Diastolic 2019 10:47:00 75 mm[Hg]    Location: LUE; [place]

on: Sitting 

[facility]

 

             Height       2019 10:47:00 60 [in_us]                [facility]

 

             Weight       2019 10:47:00 162.375 [lb_av]              [facility]ive

[facility]

 

             Body Mass Index Calculated 2019 10:47:00 31.71 kg/m2         

      [facility]

 [facility]

 

             Temperature  2019 10:47:00 98.4 [degF]  Method: Temporal [facility]

 

             Heart Rate   2019 10:47:00 74 /min      Location: L Brachial 

Artery;  [facility]

 

             Respiration Rate 2019 10:47:00 16 /min      [facility]

 

             BP Systolic  2018 09:42:00 150 mm[Hg]   Location: RUE; Pos[place]

on: Sitting 

[facility]

 

             BP Diastolic 2018 09:42:00 77 mm[Hg]    Location: RUE; [place]

on: Sitting 

[facility]

 

             Height       2018 09:42:00 60 [in_us]                [facility]

 

             Weight       2018 09:42:00 147.5625 [lb_av]              [facility]

 

             Body Mass Index Calculated 2018 09:42:00 28.82 kg/m2         

      [facility]

 

             Temperature  2018 09:42:00 98.5 [degF]  Method: Temporal [facility]

 [facility]

 

             Heart Rate   2018 09:42:00 77 /min                   [facility]

 

             Respiration Rate 2018 09:42:00 14 /min                   [facility]

 

             BP Systolic  2018 10:53:00 150 mm[Hg]   Location: LUE; [place]

on: Sitting 

[facility]

 

             BP Diastolic 2018 10:53:00 70 mm[Hg]    Location: LUE; [place]

on: Sitting 

[facility]

 

             BP Systolic  2018 10:04:00 166 mm[Hg]   Location: LUE; [place]

on: Sitting 

[facility]

 

             BP Diastolic 2018 10:04:00 72 mm[Hg]    Location: LUE; [place]

on: Sitting 

[facility]

 

             Height       2018 10:04:00 60 [in_us]                [facility]

 

             Weight       2018 10:04:00 148.375 [lb_av]              Unive

[facility]

 

             Body Mass Index Calculated 2018 10:04:00 28.98 kg/m2         

      [facility]

 [facility]

 

             Heart Rate   2018 10:04:00 57 /min                   [facility]

 

             BP Systolic  2018 13:06:00 159 mm[Hg]   Location: SKYLA; [place]

on: Sitting 

[facility]

 

             BP Diastolic 2018 13:06:00 70 mm[Hg]    Location: KERRYE; [place]

on: Sitting 

[facility]

 

             Height       2018 13:06:00 60 [in_us]                [facility]

 

             Weight       2018 13:06:00 160.1875 [lb_av]              [facility]

 

             Body Mass Index Calculated 2018 13:06:00 31.28 kg/m2         

      [facility]

 

             Heart Rate   2018 13:06:00 80 /min                   [facility]

 

             BP Systolic  2018-06-15 14:44:00 138 mm[Hg]   Location: KERRYE; Positi

on: Sitting 

[facility]

 

             BP Diastolic 2018-06-15 14:44:00 76 mm[Hg]    Location: KERRYE; [place]

on: Sitting 

[facility]

 

             Heart Rate   2018-06-15 14:44:00 72 /min      Location: L Brachial 

Artery;  [facility]

 

             BP Systolic  2018-06-15 13:24:00 144 mm[Hg]   Location: SKYLA; Positi

on: Sitting 

[facility]

 

             BP Diastolic 2018-06-15 13:24:00 71 mm[Hg]    Location: SKYLA; [place]

on: Sitting 

[facility]

 

             Height       2018-06-15 13:24:00 60 [in_us]                [facility]

 

             Weight       2018-06-15 13:24:00 159.1875 [lb_av]              [facility]

 

             Body Mass Index Calculated 2018-06-15 13:24:00 31.09 kg/m2         

      [facility]

 

             Temperature  2018-06-15 13:24:00 97 [degF]    Method: Temporal [facility]

 

             Heart Rate   2018-06-15 13:24:00 72 /min      Location: L Brachial 

Artery;  [facility]

 

             Respiration Rate 2018-06-15 13:24:00 16 /min      Quality: Normal [facility]

[facility] [place]

 [facility]

 

             BP Systolic  2018 11:31:00 151 mm[Hg]   Location: SKYLA; Pos[place]

on: Sitting 

[facility]

 

             BP Diastolic 2018 11:31:00 81 mm[Hg]    Location: SKYLA; [place]

on: Sitting 

[facility]

 

             Height       2018 11:31:00 60 [in_us]                [facility]

 

             Weight       2018 11:31:00 161.1875 [lb_av]              Univ

[facility]

 

             Body Mass Index Calculated 2018 11:31:00 31.48 kg/m2         

      [facility]

 

             Heart Rate   2018 11:31:00 65 /min                   [facility]

 

             BP Systolic  2018 11:52:00 136 mm[Hg]   Location: SKYLA; [place]

on: Sitting 

[facility]

 

             BP Diastolic 2018 11:52:00 76 mm[Hg]    Location: SKYLA; [place]

on: Sitting 

[facility]

 

             Height       2018 11:52:00 60 [in_us]                [facility]

 

             Weight       2018 11:52:00 169.375 [lb_av]              Unive

[facility]

 

             Body Mass Index Calculated 2018 11:52:00 33.08 kg/m2         

      [facility]

 

             Heart Rate   2018 11:52:00 80 /min                   [facility]

 

             BP Systolic  2018 10:31:00 152 mm[Hg]   Location: SKYLA; [place]

on: Sitting 

[facility]

 

             BP Diastolic 2018 10:31:00 82 mm[Hg]    Location: SKYLA; [place]

on: Sitting 

[facility]

 

             Height       2018 10:31:00 60 [in_us]                [facility]

 

             Weight       2018 10:31:00 171.375 [lb_av]              Unive

[facility]

 

             Body Mass Index Calculated 2018 10:31:00 33.47 kg/m2         

      [facility]

 [facility]

 

             Temperature  2018 10:31:00 97.7 [degF]  Method: Temporal Univ

[facility]

 [facility]

 

             Heart Rate   2018 10:31:00 89 /min      Location: L Brachial 

Artery;  [facility]

 

             Respiration Rate 2018 10:31:00 16 /min      Quality: Normal U

[facility]

 [facility]







Procedures





                Procedure       Date / Time Performed Performing Clinician Sourc

e

 

                [QL] CMP W/EGFR 2020 00:00:00                 [place] o

[facility]

 

                [QL] LIPID PANEL 2020 00:00:00                 [facility]

 

                [QL] VITAMIN D, 25-HYDROXY, LC/MS/MS 2020 00:00:00        

         [facility] 

[facility]

 

                [QL] CMP W/EGFR 2020 00:00:00                 [facility] [place] [facility]

 

                [QL] LIPID PANEL 2020 00:00:00                 [facility]

 

                [QL] MICROALBUMIN, RANDOM URINE (W/CREATININE) 2020 00:00:

00                 [facility]

 

                [QL] CBC (INCLUDES DIFF/PLT) 2020 00:00:00                

 [facility] 

[facility]

 

                [QL] VITAMIN D, 25-HYDROXY, LC/MS/MS 2020 00:00:00        

         [facility] 

[facility]

 

                [QL] TSH, 3RD GENERATION W/REFLEX TO FT4 2020 00:00:00    

             [facility]

 

                          AMB REFERRAL TO  WEIGHT MANAGEMENT - MEDICAL NUTRITI

ON THERAPY 2020 

12:56:31                  Lucero Stanford

 

                POC GLUCOSE     2020 11:22:00 Lucero Stanford

 

                SURGICAL PATHOLOGY REQUEST 2020 09:23:00 Lucero Stanford

 

                NV AN ELECTIVE ENDOTRACHEAL AIRWAY 2020 08:55:14 LANIE Emmanuel

 

                REMOVAL, GASTRIC BAND, LAPAROSCOPIC 2020 08:17:00 Lucero Stanford

 

                POC GLUCOSE     2020 07:32:00 Lucero Stanford

 

                ECG 12-LEAD     2020 14:11:13 Lucero Stanford

 

                HC COMPLETE BLD COUNT W/AUTO DIFF 2020 14:00:00 Lucero Stanford

 

                HEMOGLOBIN A1C  2020 14:00:00 Lucero Stanford

 

                TYPE AND SCREEN 2020 13:56:00 Lucero Stanford

 

                BASIC METABOLIC PANEL 2020 12:59:00 Lucero Stanford

 

                ESTIMATED GFR   2020 12:59:00 Lucero Stanford

 

                XR CALCANEUS 2+ VW RIGHT 2019 10:07:53 Javad Vásquez

 

                [QL] MICROALBUMIN, RANDOM URINE (W/CREATININE) 2019 00:00

:00                 [facility]

 

                ZZFL UGI W KUB  2019 07:35:00 Lucero Stanford

 

                History of Achilles tendon surgery 2019 00:00:00          

       [facility] 

[facility]

 

                History of Ostectomy of calcaneus for spur 2019 00:00:00  

               [facility]

 

                [QL] URINALYSIS, COMPLETE W/REFLEX TO CULTURE 2019 00:00:

00                 [facility]

 

                [O] Flu Test (in Office ) 2018 00:00:00                 [facility]

 

                [[facility]] CMP W/EGFR 2018 00:00:00                 [facility]

 

                [QL] VITAMIN D, 25-HYDROXY, LC/MS/MS 2018 00:00:00       

          [facility] 

[facility]

 

                [[facility]] LIPID PANEL 2018 00:00:00                 [facility]

 

                [QL] CMP W/EGFR 2018 00:00:00                 [facility]

 

                [QL] MICROALBUMIN, RANDOM URINE (W/CREATININE) 2018 00:00

:00                 [facility]

 [facility]

 

                [QL] CMP W/EGFR 2018 00:00:00                 [facility] [facility]

 

                [QL] MICROALBUMIN, RANDOM URINE (W/CREATININE) 2018 00:00

:00                 [facility]

 

                [[facility]] LIPID PANEL 2018 00:00:00                 [facility]

 

                [QL] VITAMIN D, 25-HYDROXY, LC/MS/MS 2017-10-17 00:00:00       

          [facility] 

[facility]

 

                [QL] CALCIUM, IONIZED 2017-10-17 00:00:00                 Unive

[facility]

 

                History of Laparosc Restrictive Proc Adjustable Gastric Band Adele

cement                                 

[facility]

 

                History of  Section                                 Univ

[facility]

 

                History of Bladder Cystectomy                                 Un

[facility]

 

                History of Breast Surgery Removal Of Mammary Implant Bilateral  

                               [facility]

 

                History of Cystoscopy With Removal Of Ureteral Calculus         

                        [facility] 

[facility]

 

                History of Neuroplasty Median Nerve At Carpal Tunnel            

                     [facility] 

[facility]

 

                History of Complete Colonoscopy                                 

[facility]

 

                History of Renal Lithotripsy                                 Uni

[facility]

 

                History of Breast Surgery Enlargement Procedure                 

                [facility] 

[facility]

 

                History of Breast Surgery Reduction Procedure                   

              [facility]

 

                History of Abdominoplasty                                 Univer

[facility]

 

                History of Exchange Of Intraocular Lens                         

        [facility]

 

                History of Cath Placement Of Stent 1                            

     [facility]

 

                History of Laparoscopic adjustable gastric banding              

                   [facility] 

[facility]







Plan of Care





             Planned Activity Planned Date Details      Comments     Source

 

                    Future Scheduled Test 2020 00:00:00 [QL] CMP W/EGFR [c

ode = [QL] CMP 

W/EGFR]                                             [facility]

 

                    Future Scheduled Test 2020 00:00:00 [QL] LIPID PANEL [

code = [QL] LIPID 

PANEL]                                              [facility]

 

                    Future Scheduled Test 2020 00:00:00 [QL] VITAMIN D, 25

-HYDROXY, LC/MS/MS 

[code = [QL] VITAMIN D, 25-HYDROXY, LC/MS/MS]                           [facility]

 

                    Future Scheduled Test 2020 00:00:00 INFLUENZA VACCINE 

[code = INFLUENZA 

VACCINE]                                            [facility]

 

                    Diagnostic Test Pending 2019 00:00:00 [QLH] CMP W/EGFR

 [code = [QLH] CMP 

W/EGFR]                                             [facility]

ns

 

                    Diagnostic Test Pending 2019 00:00:00 [QLH] VITAMIN D,

 25-HYDROXY, 

LC/MS/MS [code = [QLH] VITAMIN D, 25-HYDROXY, LC/MS/MS]                         

  [facility] 

[facility]

 

                    Diagnostic Test Pending 2018-10-22 00:00:00 [QLH] LIPID PANE

L [code = [QLH] 

LIPID PANEL]                                        [facility]

ns

 

                    Diagnostic Test Pending 2018-10-22 00:00:00 [QLH] CMP W/EGFR

 [code = [QLH] CMP 

W/EGFR]                                             [facility]

 

                    Diagnostic Test Pending 2018-10-22 00:00:00 [QLH] MICROALBUM

IN, RANDOM URINE 

(W/CREATININE) [code = 70930]                           [facility]

icians

 

                    Diagnostic Test Pending 2018-10-22 00:00:00 [QLH] LIPID PANE

L [code = [QLH] 

LIPID PANEL]                                        [facility]

ns

 

                    Diagnostic Test Pending 2018-10-22 00:00:00 [QLH] CMP W/EGFR

 [code = [QLH] CMP 

W/EGFR]                                             [facility]

ns

 

                    Diagnostic Test Pending 2018-10-22 00:00:00 [QLH] MICROALBUM

IN, RANDOM URINE 

(W/CREATININE) [code = 05864]                           [facility]

icians

 

                    Future Scheduled Test 2016 00:00:00 65+ PNEUMOCOCCAL V

ACCINE (1 of 1 - 

PPSV23) [code = 65+ PNEUMOCOCCAL VACCINE (1 of 1 - PPSV23)]                     

      [facility] Scheduled Test 2001 00:00:00 BREAST CANCER SCRE

ENING [code = BREAST

 CANCER SCREENING]                                  [facility] Scheduled Test 2001 00:00:00 COLONOSCOPY SCREEN

ING [code = 

COLONOSCOPY SCREENING]                              [facility] Scheduled Test 2001 00:00:00 SHINGLES VACCINES 

(#1) [code = 

SHINGLES VACCINES (#1)]                             [facility] Scheduled Test 1961 00:00:00 DIABETIC FOOT EXAM

 [code = DIABETIC 

FOOT EXAM]                                          [facility] Scheduled Test 1961 00:00:00 URINE MICROALBUMIN

 [code = URINE 

MICROALBUMIN]                                       [facility] Scheduled Test 1951 00:00:00 DIABETIC RETINAL E

YE EXAM [code = 

DIABETIC RETINAL EYE EXAM]                           [facility] Appointment 2020-10-21 13:30:00 NADIA DAS,         

      [facility]







Encounters





             Start Date/Time End Date/Time Encounter Type Admission Type Attendi

[facility]   Care Department Encounter ID    Source

 

             2020 10:45:00 2020 10:45:00 Appointment; ESTEVAN MARIEE P.A. CAMPOS, BERTHA, P.A. [facility], [address] [id number]

1        [facility]

 

             2020 11:30:2020 11:30:00 Appointment; THIAGO LIRIANO M.D. NASSIF, JULIA, M.D. [facility], [address] [id number] 

       [facility]

 

             2020 10:00:00 2020 10:00:00 Appointment; ESTEVAN MARIEE P.A. CAMPOS, BERTHA, P.A. [facility] [id number]        

[facility]

[facility]

 

        2020 00:00:2020 00:00:00 Outpatient         DANIEL STANFORD [facility]     

[id number]                           [facility]

 

             2020 10:00:00 2020 10:00:00 Appointment; THIAGO LIRIANO M.D. NASSIF, JULIA, M.D. [facility], [address] [id number] 

       [facility]

 

             2020 10:15:2020 10:15:00 Appointment; ESTEVAN MARIEE P.A. CAMPOS, BERTHA, P.A. [facility] [place], [address] [id number]

0        [facility]

[facility]

 

        2020 00:00:00 2020 00:00:00 Outpatient         DANIEL STANFORD

[facility]     [address]     

[id number]                           [facility]

 

             2019 10:00:00 2019 10:00:00 Appointment; THIAGO LIRIANO M.D. NASSIF, JULIA, M.D. [facility], [address] [id number] 

       [facility]

 

        2019 00:00:00 2019 00:00:00 Outpatient         STANFORDDANIEL ARCE [facility]     

[id number]                           [facility]

 

             2019 09:30:00 2019 09:30:00 Appointment; THIAGO LIRIANO M.D. NASSIF, JULIA, M.D. [facility], [address] [id number] 

       [facility]

 

             2019 09:30:00 2019 09:30:00 Appointment; THIAGO LIRIANO M.D. NASSIF, JULIA, M.D. [facility] [address] [id number]    

    [facility]

 

             2019 10:45:00 2019 10:45:00 Appointment; TRACIE UNDERWOOD AP RN TRAN, THUY, APRN      [facility] [phone]        [facility]

 

             2018 09:30:00 2018 09:30:00 Appointment; DIO, LION, A

PRN               DIO, 

LION, APRN     [facility] [id number]        [facility]

 

             2018 10:00:00 2018 10:00:00 Appointment; THIAGO LIRIANO M.D. NASSIF, JULIA, M.D. [facility] [address] [id number]    

    [facility]

 

             2018 13:00:00 2018 13:00:00 Appointment; THIAGO LIRIANO M.D. NASSIF, JULIA, M.D. [facility] [address] [id number]    

    [facility]

 

                2018-06-15 13:30:00 2018-06-15 13:30:00 Appointment; CYNTHIA GARLAND NP TEJADA-FOSTER, NORMA, NP [facility] [phone]

[phone]     [facility]

 

             2018 11:30:00 2018 11:30:00 Appointment; THIAGO LIRIANO M.D. NASSIF, JULIA, M.D. [facility] [address] [id number]    

    [facility]

 

             2018 11:30:00 2018 11:30:00 Appointment; THIAGO LIRIANO M.D. NASSIF, JULIA, M.D. [facility]             [id number]        [facility]

 

             2018 10:30:00 2018 10:30:00 Appointment; ESTEVAN MARIEE P.A. CAMPOS, BERTHA, P.A. [facility] [phone]        [facility] 

[facility]icians

 

             2017 08:00:00 2017 08:00:00 Appointment; ESTEVAN MARIEE P.A. CAMPOS, BERTHA, P.A. U[facility]             [facility] [id number]        [facility] 

[facility]

 

             2017-10-18 10:00:00 2017-10-18 10:00:00 Appointment; THIAGO LIRIANO M.D. NASSIF, JULIA, M.D. [facility] [phone]        [facility] 

Physicians

 

             2017 13:30:00 2017 13:30:00 Appointment; AMISHA KIMBALL P.A. SPOONER, JOSEPH, P.A. UTP             UTP             [id number]        [facility]

 

                    2017 08:45:00 2017 08:45:00 Appointment; LUCAS XAVIER M.D. LI-YUNG HING, ANDREW, M.D. [facility]        UTP        [phone] 

  [facility]

 

             2017 11:00:00 2017 11:00:00 Appointment; AMISHA KIMBALL P.A. SPOONER, JOSEPH, P.A. UTP             UTP             [phone]        [facility]

 

                    2017 08:00:00 2017 08:00:00 Appointment; LUCAS XAVIER M.D. LI-YUNG HING, ANDREW, M.D. [facility]        UTP        [phone] 

  [facility]

 

             2017 10:00:00 2017 10:00:00 Appointment; AMISHA KIMBALL P.A. SPOONER, JOSEPH, P.A. UTP             UTP             [phone]        [facility]

 

             2017 11:00:00 2017 11:00:00 Appointment; AMISHA KIMBALL P.A. SPOONER, JOSEPH PIDRIS RAE             UTP             [id number]        [facility]

 

             2017 11:15:00 2017 11:15:00 Appointment; ESTEVAN MARIEE P.A. CAMPOS, BERTHA, P.A. UTP             UTP             [id number]        [facility]

 

             2017 11:30:00 2017 11:30:00 Appointment; THIAGO LIRIANO M.D. NASSIF, JULIA, M.D. UTP             UTP             [id number]        [facility]

 

             2017 12:45:00 2017 12:45:00 Appointment; ESTEVAN MARIEE P.A. CAMPOS, BERTHA, PJuveAJuve UTP             UTP             [id number]        [facility]

 

             2017 11:15:00 2017 11:15:00 Appointment; ESTEVAN MARIEE P.A. CAMPOS, BERTHA, P.A. UTP             UTP             [id number]        [facility]

 

             2017 14:30:00 2017 14:30:00 Appointment; THIAGO LIRIANO M.D. NASSIF, JULIA, M.D. UTP             UTP             [phone]        [facility]

 

             2016 10:30:00 2016 10:30:00 Appointment; DANAE ZAVALA NP HOANG, CHRISTINA, NP UTP             UTP             [phone]        [facility]

 

             2016 08:30:00 2016 08:30:00 Appointment; THIAGO LIRIANO M.D. NASSIF, JULIA, M.D. UTP             UTP             [phone]        [facility]

 

             2016 10:30:00 2016 10:30:00 Appointment; ESTEVAN MARIEE P.A. CAMPOS, BERTHA, P.A. UTP             UTP             [phone]        [facility]

 

             2016 10:15:00 2016 10:15:00 Appointment; TRACIE UNDERWOOD NP TRAN, THUY, NP        UTP             UTP             [phone]        [facility]

 

             2016 09:15:00 2016 09:15:00 Appointment; ESTEVAN MARIEE P.A. CAMPOS, BERTHA, P.A. UTP             UTP             [id number]        [facility]

 

             2016 08:00:00 2016 08:00:00 Appointment; THIAGO LIRIANO M.D. NASSIF, JULIA M.D. UTP             UTP             [id number]        [facility]

 

             2016 09:30:00 2016 09:30:00 Appointment; ESTEVAN MARIEE P.A. CAMPOS, BERTHA, P.A. UTP             UTP             [id number]        [facility]

 

             2016 09:15:00 2016 09:15:00 Appointment; ESTEVAN MARIEE P.A. CAMPOS, BERTHA, P.A. UTP             UTP             [phone]        [facility]

 

             2016 10:15:00 2016 10:15:00 Appointment; ESTEVAN MARIEE P.A. CAMPOS, BERTHA, P.A. UTP             UTP             [phone]        [facility]

 

                2016 11:00:00 2016 11:00:00 Appointment; MARIO JEAN M.D. SHACKELFORD, JAMES, M.D. UTP          UTP          [phone]     [facility]

 

             2016 08:00:00 2016 08:00:00 Appointment; THIAGO LIRIANO M.D. NASSIF, JULIA, M.D. UTP             UTP             [phone]        [facility]

 

             2016-02-15 10:30:00 2016-02-15 10:30:00 Appointment; ESTEVAN MARIEE P.A. CAMPOS, BERTHA, P.A. UTP             UTP             [id number]        [facility]

 

             2016 13:45:00 2016 13:45:00 Appointment; PENNY HE P.A. CRUZ, LETICIA PSAMUEL. UTP             UTP             [phone]        [facility]

 

             2015 13:00:00 2015 13:00:00 Appointment; SIMON CARR N P BECK, SHERI, NP       UTP             UTP             [phone]        [facility]







Results





           Test Description Test Time  Test Comments Results    Result Comments 

Source

 

                CHEST SINGLE (PORTABLE) 2020 20:41:00                     

                              

                                                   [place] [address]      Patient Name: SUPRIYA MOULTON                                
MR #: [id number]                  : 1950                               
   Age/Sex: 69/F  Acct #: [id number]                              Req #: [id number]
[id number]  [facility] Physician:                                                      
Ordered by: TREVER NUÑEZ MD                         Report #: [id number]
       Location: ER                                      Room/Bed:              
    
________________________________________________________________________________

___________________    Procedure: [id number] DX/CHEST SINGLE (PORTABLE)  Exam 
Date:                             Exam Time:                                    
          REPORT STATUS: Signed    EXAMINATION:  CHEST SINGLE (PORTABLE)        
 INDICATION:          chest pain    Y      COMPARISON:  None available.         
 FINDINGS:  AP view         TUBES and LINES:  None.      LUNGS:  Lungs are well 
inflated.  There is no evidence of pneumonia or   pulmonary edema.      PLEURA: 
No pleural effusion or pneumothorax.      HEART AND MEDIASTINUM:  The 
cardiomediastinal silhouette is unremarkable.          BONES AND SOFT TISSUES:  
No acute osseous lesion.  Soft tissues are   unremarkable.      UPPER ABDOMEN: 
No free air under the diaphragm.          IMPRESSION:    No acute thoracic 
abnormality.         Signed by: Dr. Tomi Khan MD on 2020 8:41 PM      
 Dictated By: TOMI KHAN MD  Electronically Signed By: TOMI KHAN MD on 
20  Transcribed By: MARY on 20       COPY TO:   
TREVER NUÑEZ MD                                     

 

                    [QL] LIPID PANEL    2020 08:18:00   

 

                                        Test Item

 

             CHOLESTEROL, TOTAL; Above High Threshold (test code = 2093-3) 391 m

g/dl    <200                       

 

             HDL CHOLESTEROL; Below Low Threshold (test code = 2085-9) 39 mg/dl 

    > OR = 50                  

 

             TRIGLYCERIDES; Above High Threshold (test code = 2571-8) 1509 mg/dl

   <150                      

Results verified by repeat analysis on dilution.  If a non-fasting specimen was 
collected, considerrepeat triglyceride testing on a fasting specimenif 
clinically indicated. Sonya et al. J. of Clin. Lipidol. 2015;9:129-169.  
There is increased risk of pancreatitis when the triglyceride concentration is 
very high (> or = 500 mg/dL, especially if > or = 1000 mg/dL). Sonya et al. 
J. of Clin. Lipidol. 2015;9:129-169.

 

             LDL-CHOLESTEROL (test code = 20669-3) See Comment                  

          LDL cholesterol not 

calculated. Triglyceride levelsgreater than 400 mg/dL invalidate calculated LDL 
results. Reference range: <100 Desirable range <100 mg/dL for primary 
prevention;  <70 mg/dL for patients with CHD or diabetic patients with > or = 2 
CHD risk factors. LDL-C is now calculated using the Lefty-Sun calculation, 
which is a validated novel method providing better accuracy than the Friedewald 
equation in the estimation of LDL-C. Lefty SS et al. LISETTE. 2013;310(19): 2061-
2068 (http://education.Wiseryou/faq/RCV496)

 

             CHOL/HDLC RATIO (test code = CHOL/HDLC RATIO) 10.0 {CALC}  <5.0    

                   

 

             NON HDL CHOLESTEROL (test code = NON HDL CHOLESTEROL) 352 {MG/DL  C

AL} <130                      

Non-HDL level > or = 220 is very high and may indicate genetic familial 
hypercholesterolemia (FH). Clinical assessment and measurement of blood lipid 
levels should be considered for all first-degree relatives of patients with an 
FH diagnosis. For patients with diabetes plus 1 major ASCVD risk factor, 
treating to a non-HDL-C goal of <100 mg/dL (LDL-C of <70 mg/dL) is considered a 
therapeutic option.





[facility][QL] CMP W/[id number] 08:18:00* 



             Test Item    Value        Reference Range Interpretation Comments

 

             GLUCOSE; Above High Threshold (test code = 1547-9) 118 mg/dl    65-

99                     Fasting 

reference interval For someone without known diabetes, a glucose valuebetween 
100 and 125 mg/dL is consistent withprediabetes and should be confirmed with 
afollow-up test.

 

             UREA NITROGEN (BUN) (test code = UREA NITROGEN (BUN)) 38 mg/dl     

7-25                       

 

             CREATININE (test code = CREATININE) 1.11 mg/dl   0.50-0.99         

        For patients >49 

years of age, the reference limitfor Creatinine is approximately 13% higher for 
peopleidentified as -American.

 

                    eGFR NON-AFR. AMERICAN (test code = eGFR NON-AFR. AMERICAN) 

51 {ML/MIN/1.7}     > OR

 = 60                                                

 

                    eGFR  (test code = eGFR ) 59

 {ML/MIN/1.7}     > OR =

 60                                                  

 

             BUN/CREATININE RATIO (test code = BUN/CREATININE RATIO) 34 {CALC}  

  6-22                       

 

             SODIUM (test code = SODIUM) 138 mmol/L   135-146      N            

 

 

             POTASSIUM (test code = POTASSIUM) 4.5 mmol/L   3.5-5.3      N      

       

 

             CHLORIDE (test code = CHLORIDE) 106 mmol/L          N        

     

 

             CARBON DIOXIDE (test code = CARBON DIOXIDE) 22 mmol/L    20-32     

   N             

 

             CALCIUM (test code = CALCIUM) 9.9 mg/dl    8.6-10.4     N          

   

 

             PROTEIN, TOTAL (test code = PROTEIN, TOTAL) 6.6 g/dl     6.1-8.1   

   N             

 

             ALBUMIN (test code = ALBUMIN) 4.0 g/dl     3.6-5.1      N          

   

 

             GLOBULIN (test code = GLOBULIN) 2.6 {G/DL  CALC} 1.9-3.7      N    

         

 

                ALBUMIN/GLOBULIN RATIO (test code = ALBUMIN/GLOBULIN RATIO) 1.5 

{CALC}      1.0-2.5         N

                                         

 

             BILIRUBIN, TOTAL; Normal (test code = 40299-7) 0.5 mg/dl    0.2-1.2

      N             

 

             ALKALINE PHOSPHATASE (test code = ALKALINE PHOSPHATASE) 67 u/l     

         N             

 

             AST; Normal (test code = 1916-6) 14 u/l       10-35        N       

      

 

             ALT; Normal (test code = 1742-6) 9 u/l        6-29         N       

      





[facility][QL] VITAMIN D, 25-HYDROXY, LC/MS/[id number] 
08:18:00* 



             Test Item    Value        Reference Range Interpretation Comments

 

                VITAMIN D,25-OH,TOTAL,IA (test code = VITAMIN D,25-OH,TOTAL,IA) 

21 ng/ml                  

                                        Vitamin D Status         25-OH Vitamin D

: Deficiency:                    <20 

ng/mLInsufficiency:             20 - 29 ng/mLOptimal:                 > or = 30 
ng/mL For 25-OH Vitamin D testing on patients on D2-supplementation and patients
 for whom quantitation of D2 and D3 fractions is required, the 
[facility]()25-OH VIT D, (D2,D3), LC/MS/MS is recommended: order code 68997 
(patients >2yrs).See Note 1 Note 1 For additional information, please refer to 
http://education.Wiseryou/faq/CAF617 (This link is being provided 
for informational/educational purposes only.)





[facility][] LIPID [id number] 08:23:00* 



             Test Item    Value        Reference Range Interpretation Comments

 

             CHOLESTEROL, TOTAL; Above High Threshold (test code = 2093-3) 332 m

g/dl    <200                       

 

             HDL CHOLESTEROL; Below Low Threshold (test code = 5-9) 42 mg/dl 

    > OR = 50                  

 

             TRIGLYCERIDES; Above High Threshold (test code = 2571-8) 1131 mg/dl

   <150                      

Results verified by repeat analysis on dilution.  If a non-fasting specimen was 
collected, considerrepeat triglyceride testing on a fasting specimenif 
clinically indicated. Sonya et al. J. of Clin. Lipidol. 2015;9:129-169.  
There is increased risk of pancreatitis when the triglyceride concentration is 
very high (> or = 500 mg/dL, especially if > or = 1000 mg/dL). Hassan et al. 
J. of Clin. Lipidol. 2015;9:129-169.

 

             LDL-CHOLESTEROL (test code = 07145-7) See Comment                  

          LDL cholesterol not 

calculated. Triglyceride levelsgreater than 400 mg/dL invalidate calculated LDL 
results. Reference range: <100 Desirable range <100 mg/dL for primary 
prevention;  <70 mg/dL for patients with CHD or diabetic patients with > or = 2 
CHD risk factors. LDL-C is now calculated using the Lefty-Sun calculation, 
which is a validated novel method providing better accuracy than the Friedewald 
equation in the estimation of LDL-C. Lefty HARMON et al. LISETTE. 2013;310(19): 2061-
2068 (http://education.Wiseryou/faq/JDK585)

 

             CHOL/HDLC RATIO (test code = CHOL/HDLC RATIO) 7.9 {CALC}   <5.0    

                   

 

             NON HDL CHOLESTEROL (test code = NON HDL CHOLESTEROL) 290 {MG/DL  C

AL} <130                      

Non-HDL level > or = 220 is very high and may indicate genetic familial 
hypercholesterolemia (FH). Clinical assessment and measurement of blood lipid 
levels should be considered for all first-degree relatives of patients with an 
FH diagnosis. For patients with diabetes plus 1 major ASCVD risk factor, 
treating to a non-HDL-C goal of <100 mg/dL (LDL-C of <70 mg/dL) is considered a 
therapeutic option.





[facility][QL] MICROALBUMIN, RANDOM URINE (W/CREATININE)
2020 08:23:00* 



             Test Item    Value        Reference Range Interpretation Comments

 

                    CREATININE, RANDOM URINE (test code = CREATININE, RANDOM URI

NE) 112 mg/dl           

                          N                          

 

             MICROALBUMIN (test code = MICROALBUMIN) 41.8 mg/dl                N

            Results verified by 

repeat analysis on dilution. Reference RangeNot established

 

                                        MICROALBUMIN/CREATININE RATIO, RANDOM UR

INE (test code = MICROALBUMIN/CREATININE

 RATIO, RANDOM URINE) 373 {MCG/MG CRE} <30                             The ADA d

efines abnormalities in 

albuminexcretion as follows: Category         Result (mcg/mg creatinine) Normal 
                   <30Microalbuminuria          Clinical albuminuria   > 
OR = 300 The ADA recommends that at least two of threespecimens collected within
 a 3-6 month period beabnormal before considering a patient to bewithin a 
diagnostic category.





[facility][QL] CMP W/[id number] 08:23:00* 



             Test Item    Value        Reference Range Interpretation Comments

 

             GLUCOSE; Above High Threshold (test code = 1547-9) 182 mg/dl    65-

99                     Fasting 

reference interval For someone without known diabetes, a glucosevalue >125 mg/dL
 indicates that they may havediabetes and this should be confirmed with afollow-
up test.

 

             UREA NITROGEN (BUN) (test code = UREA NITROGEN (BUN)) 50 mg/dl     

7-25                       

 

             CREATININE (test code = CREATININE) 1.19 mg/dl   0.50-0.99         

        For patients >49 

years of age, the reference limitfor Creatinine is approximately 13% higher for 
peopleidentified as -American.

 

                    eGFR NON-AFR. AMERICAN (test code = eGFR NON-AFR. AMERICAN) 

47 {ML/MIN/1.7}     > OR

 = 60                                                

 

                    eGFR  (test code = eGFR ) 54

 {ML/MIN/1.7}     > OR =

 60                                                  

 

             BUN/CREATININE RATIO (test code = BUN/CREATININE RATIO) 42 {CALC}  

  6-22                       

 

             SODIUM (test code = SODIUM) 137 mmol/L   135-146      N            

 

 

             POTASSIUM (test code = POTASSIUM) 5.0 mmol/L   3.5-5.3      N      

       

 

             CHLORIDE (test code = CHLORIDE) 106 mmol/L          N        

     

 

             CARBON DIOXIDE (test code = CARBON DIOXIDE) 20 mmol/L    20-32     

   N             

 

             CALCIUM (test code = CALCIUM) 10.3 mg/dl   8.6-10.4     N          

   

 

             PROTEIN, TOTAL (test code = PROTEIN, TOTAL) 7.0 g/dl     6.1-8.1   

   N             

 

             ALBUMIN (test code = ALBUMIN) 4.4 g/dl     3.6-5.1      N          

   

 

             GLOBULIN (test code = GLOBULIN) 2.6 {G/DL  CALC} 1.9-3.7      N    

         

 

                ALBUMIN/GLOBULIN RATIO (test code = ALBUMIN/GLOBULIN RATIO) 1.7 

{CALC}      1.0-2.5         N

                                         

 

             BILIRUBIN, TOTAL; Normal (test code = 40317-5) 0.4 mg/dl    0.2-1.2

      N             

 

             ALKALINE PHOSPHATASE (test code = ALKALINE PHOSPHATASE) 81 u/l     

         N             

 

             AST; Normal (test code = 1916-6) 14 u/l       10-35        N       

      

 

             ALT; Normal (test code = 1742-6) 7 u/l        6-29         N       

      





[facility][QL] CBC (INCLUDES DIFF/PLT)2020 08:23:00* 



             Test Item    Value        Reference Range Interpretation Comments

 

                    WHITE BLOOD CELL COUNT (test code = WHITE BLOOD CELL COUNT) 

4.6 {Thousand/u}    

3.8-10.8                  N                          

 

                    RED BLOOD CELL COUNT (test code = RED BLOOD CELL COUNT) 4.12

 {Million/uL}   

3.80-5.10                 N                          

 

             HEMOGLOBIN; Normal (test code = 20563-3) 12.1 g/dl    11.7-15.5    

N             

 

             HEMATOCRIT; Normal (test code = 4544-3) 36.3 %       35.0-45.0    N

             

 

             MCV; Normal (test code = 787-2) 88.1 fL      80.0-100.0   N        

     

 

             MCHC; Normal (test code = 25301-1) 33.3 g/dl    32.0-36.0    N     

        

 

             RDW; Normal (test code = 788-0) 13.7 %       11.0-15.0    N        

     

 

             PLATELET COUNT; Normal (test code = 777-3) 213 {Thousand/u} 140-400

      N             

 

             MPV; Normal (test code = 74130-6) 11.5 fL      7.5-12.5     N      

       

 

                    ABSOLUTE NEUTROPHILS (test code = ABSOLUTE NEUTROPHILS) 2972

 {cells/uL}     

8852-7721                 N                          

 

                    ABSOLUTE LYMPHOCYTES (test code = ABSOLUTE LYMPHOCYTES) 1003

 {cells/uL}     850-3900

                          N                          

 

             ABSOLUTE MONOCYTES (test code = ABSOLUTE MONOCYTES) 465 {cells/uL} 

200-950      N             

 

             ABSOLUTE EOSINOPHILS (test code = ABSOLUTE EOSINOPHILS) 120 {cells/

uL}        N            

 

 

             ABSOLUTE BASOPHILS (test code = ABSOLUTE BASOPHILS) 41 {cells/uL} 0

-200        N             

 

             NEUTROPHILS (test code = NEUTROPHILS) 64.6 %                    N  

           

 

             LYMPHOCYTES (test code = LYMPHOCYTES) 21.8 %                    N  

           

 

             MONOCYTES; Normal (test code = 26485-3) 10.1 %                    N

             

 

             EOSINOPHILS; Normal (test code = 30530-8) 2.6 %                    

 N             

 

             BASOPHILS; Normal (test code = 91591-0) 0.9 %                     N

             





[facility][QL] TSH, 3RD GENERATION W/REFLEX TO [id number]
 08:23:00* 



             Test Item    Value        Reference Range Interpretation Comments

 

                                        TSH, 3RD GENERATION W/REFLEX TO FT4 (miah

t code = TSH, 3RD GENERATION W/REFLEX TO

 FT4)           3.08 {MIU/L}    0.40-4.50       N                





[facility][QL] VITAMIN D, 25-HYDROXY, LC/MS/[id number] 
08:23:00* 



             Test Item    Value        Reference Range Interpretation Comments

 

                VITAMIN D,25-OH,TOTAL,IA (test code = VITAMIN D,25-OH,TOTAL,IA) 

16 ng/ml                  

                                        Vitamin D Status         25-OH Vitamin D

: Deficiency:                    <20 

ng/mLInsufficiency:             20 - 29 ng/mLOptimal:                 > or = 30 
ng/mL For 25-OH Vitamin D testing on patients on D2-supplementation and patients
 for whom quantitation of D2 and D3 fractions is required, the 
QuestAssureD(TM)25-OH VIT D, (D2,D3), LC/MS/MS is recommended: order code 59851 
(patients >2yrs).See Note 1 Note 1 For additional information, please refer to 
http://education.Interesante.com.Mapittrackit/faq/USK433 (This link is being provided 
for informational/educational purposes only.)





[facility] 
[facility] 



             Test Item    Value        Reference Range Interpretation Comments

 

             Glucose POC Lifescan (test code = Glucose POC Lifescan) 82         

                             





[facility][O] Hemoglobin A1c (in office)2020 11:25:00
  * 



             Test Item    Value        Reference Range Interpretation Comments

 

             HEMOGLOBIN A1c (test code = 4548-4) 7.3                            

         





[facility]2020 
10:12:00* 



             Test Item    Value        Reference Range Interpretation Comments

 

             Glucose POC Lifescan (test code = Glucose POC Lifescan) 127        

                             





[facility][O] Hemoglobin A1c (in office)2020 10:12:00
  * 



             Test Item    Value        Reference Range Interpretation Comments

 

             HEMOGLOBIN A1c (test code = 4548-4) 7.5                            

         





[facility]icians[facility] [id number] 16:27:54* 



             Test Item    Value        Reference Range Interpretation Comments

 

             Case number (test code = 8660173) [id number]                     

       

 

                    Surgical pathology report (test code = 2255) See link below 

for PDF Lab Report  

                                                     

 

             Result status (test code = 3447642) This is Final Report for [id number]

6705-3                            





[facility] [id number] 11:24:50* 



             Test Item    Value        Reference Range Interpretation Comments

 

             POC glucose (test code = 48254-9) 207 mg/dL    65-99        H      

       Name: Gambini AntonetteDevice ID: [id number] 

 

             Lab Interpretation (test code = 90723-7) Abnormal                  

              





[facility]14Earnest Emmanuel     2020 
 8:55 AMAirwayPerformed by: Earnest EmmanuelAuthorized by: tAif Larson MD  Location:  ORUrgency:  ElectiveDifficult Airway: No  Anesthesiologist:  
Atif Larson MDResilenkat/CRNA/AA:  Earnest EmmanuelPerformed by:    
resident/CRNA/AAPreoxygenated with 100% O2: Yes  C-spine Precautions Maintained 
Throughout: Yes  Mask Ventilation:  Easy maskFinal Airway Type:  Endotracheal 
airwayFinal Endotracheal Airway:  ETTCuffed: Yes  Technique Used:  Direct 
laryngoscopyDevices/Methods Used in Placement:  Intubating styletInsertion Site:
  OralBlade Type:  MacintoshLaryngoscope Blade/Videolaryngoscope Blade Size:  
3ETT Size (mm):  7.0Cuff at minimum occlusion pressure: Yes  Measured from:  
LipsETT to Lips (cm):  21Placement Verified by: CO2 detection, direct 
visualization and equal breath sounds  Laryngoscopic view:  Grade IIb - view of 
arytenoids or posterior of glottis onlyRapid Sequence Induction (RSI): No  
Modified RSI: No  Number of Attempts at Approach:  1[facility] MethodistHemoglobin 
[id number] 15:27:10* 



             Test Item    Value        Reference Range Interpretation Comments

 

             Hemoglobin A1C (test code = 56024-2) 7.0 %        4-5.6        H   

         HbA1c cutoffs for diagnosing 

diabetes:4.0% - 5.6% = normal5.7% - 6.4% = increased risk for diabetes 
(prediabetes)9>=6.5% = [id number] for glycemic control (ADA 2016)< 7.0%  
Target for nonpregnant adults with diabetes. More or less stringent targets may 
be appropriate for individual patients. <7.5%   Target for Children and 
adolescents with type 1 diabetes.  

 

             Lab Interpretation (test code = 08253-5) Abnormal                  

              





[facility] MethodistType and [id number] 15:22:00* 



             Test Item    Value        Reference Range Interpretation Comments

 

             ABO grouping (test code = 883-9) O                                 

      

 

             Rh type (test code = 04740-0) NEG                                  

   

 

             Antibody screen (gel) (test code = 890-4) NEG                      

               





[place] [facility] MethodistBasic metabolic [id number] 14:53:06* 



             Test Item    Value        Reference Range Interpretation Comments

 

             Sodium (test code = 2951-2) 140          135- 148 mEq/L            

   

 

             Potassium (test code = 2823-3) 4.7          3.5- 5.0 mEq/L         

      

 

             Chloride (test code = 2075-0) 101          98- 112 mEq/L           

    

 

             CO2 (test code = -9) 23           24- 31 mEq/L L             

 

             Anion gap (test code = 33037-3) 16@ANIO      7- 15 mEq/L  H        

     

 

             BUN (test code = 3094-0) 31 mg/dL     8-23         H             

 

             Creatinine (test code = 2160-0) 1.00 mg/dL   0.5-0.9      H        

     

 

             Glucose (test code = 2345-7) 383 mg/dL    65-99        H           

  

 

             Calcium (test code = 93978-2) 10.8 mg/dL   8.8-10.2     H          

   

 

             Lab Interpretation (test code = 44347-6) Abnormal                  

              





[place] MethodistEstimated [id number] 14:53:05* 



             Test Item    Value        Reference Range Interpretation Comments

 

             Estimated GFR (test code = 5488) 58           mL/min/1.73 m2 A     

       Catergory  Units    

InterpretationG1         >=90     Normal or highG2         60-89    Mildly 
mvipmejbvC7o        45-59    Mildly to moderately blsmjmyjlO9u        30-44    
Moderately to severely decreasedG4         15-29    Severely decreasedG5        
 <15      Kidney failureThe eGFR was calculated using the Chronic Kidney Disease
 Epidemiology Collaboration (CKD-EPI) equation. Interpretation is based on 
recommendations of the National Kidney Foundation-Kidney Disease Outcomes 
Quality Initiative (NKF-KDOQI) published in 2014. 

 

             Lab Interpretation (test code = 23986-4) Abnormal                  

              





[facility] MethodistECG 12 [id number] 14:40:59* 



             Test Item    Value        Reference Range Interpretation Comments

 

             Ventricular rate (test code = 253) 94                              

        

 

             Atrial rate (test code = 255) 94                                   

   

 

             NV interval (test code = 266) 198                                  

   

 

             QRSD interval (test code = 260) 94                                 

     

 

             QT interval (test code = 264) 380                                  

   

 

             QTC interval (test code = 265) 475                                 

    

 

             P axis 1 (test code = 267) 58                                      

 

             QRS axis 1 (test code = 268) 38                                    

  

 

             T wave axis (test code = 270) 85                                   

   

 

                          EKG impression (test code = 273) Normal sinus rhythm-S

eptal infarct (cited on or

 before 26-MAR-2019)-Abnormal ECG-In automated comparison with ECG of 
26-MAR-2019 11:59,-Vent. rate has increased BY  36 BPM-QT has 
lengthened-Electronically Signed By Christina Vasquez MD ([id number]) on 2020 2:40:55 
PM                                                           





[place] MethodistCBC with platelet and fykzxrthiqpb3630-81-27 14:37:08* 



             Test Item    Value        Reference Range Interpretation Comments

 

             WBC (test code = 23462-3) 5.23         4.50- 11.00 k/uL            

   

 

             RBC (test code = 46178-1) 4.21 m/uL    4.2-5.5                    

 

             HGB (test code = 718-7) 12.2 g/dL    12-16                      

 

             HCT (test code = 4544-3) 37.7 %       37-47                      

 

             MCV (test code = 787-2) 89.5 fL                           

 

             MCH (test code = 785-6) 29.0 pg      27-34                      

 

             MCHC (test code = 786-4) 32.4 g/dL    31-37                      

 

             RDW - SD (test code = 64360-7) 46.3 fL      37-55                  

    

 

             MPV (test code = 05506-9) 11.5 fL      8.8-13.2                   

 

             Platelet count (test code = 39071-8) 199          150- 400 k/uL    

           

 

             Nucleated RBC (test code = 27785-5) 0.00         /100 WBC          

         

 

             Neutrophils (test code = 11969-3) 72.8 %       39-69        H      

       

 

             Lymphocytes (test code = 44195-6) 15.3 %       25-45        L      

       

 

             Monocytes (test code = 26485-3) 8.4 %        0-10                  

     

 

             Eosinophils (test code = 09908-3) 2.3 %        0-5                 

       

 

             Basophils (test code = 89354-3) 0.8 %        0-1                   

     

 

             Lab Interpretation (test code = 26974-7) Abnormal                  

              





[facility][QLH] MICROALBUMIN, RANDOM URINE (W/CREATININE)2019 
11:10:01* 



             Test Item    Value        Reference Range Interpretation Comments

 

             Urine Microalbumin (test code = Urine Microalbumin) 235.0 mg/L     

                        No 

established reference range.

 

             U Creatinine (test code = 2161-8) 64.30 mg/dl                      

      No established reference 

range.

 

                                        Urine Microalbuming Creatinine Ratio; Ab

ove High Threshold (test code = 09808-4)

                365.5 mg/g      <=30.0                           





[facility][O] Hemoglobin A1c (in office)2019 10:15:00
  * 



             Test Item    Value        Reference Range Interpretation Comments

 

             HEMOGLOBIN A1c (test code = 4548-4) 7.3                            

         





[facility]iciansGl[facility] (Point of Care In Office)2019 
10:14:00* 



             Test Item    Value        Reference Range Interpretation Comments

 

             Glucose POC Lifescan (test code = Glucose POC Lifescan) 104        

                             





[facility] PhysiciansFL DARIO W [id number] 10:03:06Hm Interface, 
Radiology Results  - 2019 10:06 AM CSTEXAMINATION:  LATIA ELLISBCLINICAL HISTORY:  Z98.84 Bariatric surgery status, I10 Essential (primary) 
hypertension, Esophageal refluxCOMPARISON:  None.TECHNIQUE:  UPPER GI SERIES was
 performed with barium.FLUOROSCOPIC TIME:  1 minute 12 secondsTotal number of 
fluoroscopic images: 22IMPRESSION: radiograph of the abdomen demonstrates 
an indwelling gastric lap band. The Phi angle is 37 degrees. The subcutaneous 
port reservoir has been disconnected/removed.Esophagus demonstrates normal co
ntour, distention and motility.There is no hiatal hernia. There is no delay of c
ontrast passage across the lap band into the stomach, no significant narrowing/s
tricture of the stomach at the level of the band is seen. This suggests that the
 band reservoir is decompressed.Below the band, gastric contour and distention a
re normal. There is normal emptying into the duodenum.No gastroesophageal reflux
 was observed.[id number] MethodistGlucose (Point of Care In Office)
2019 09:35:00* 



             Test Item    Value        Reference Range Interpretation Comments

 

             Glucose POC Lifescan (test code = Glucose POC Lifescan) 124        

                             





[facility][O] Hemoglobin A1c (in office)2019 09:35:00
  * 



             Test Item    Value        Reference Range Interpretation Comments

 

             HEMOGLOBIN A1c (test code = 4548-4) 6.7                            

         





[facility][O] Hemoglobin A1c (in office)2019 09:32:00
  * 



             Test Item    Value        Reference Range Interpretation Comments

 

             HEMOGLOBIN A1c (test code = 4548-4) 7.5                            

         





[facility]2019 
09:32:00* 



             Test Item    Value        Reference Range Interpretation Comments

 

             Glucose POC Lifescan (test code = Glucose POC Lifescan) 118        

                             





[facility][O] Urine Dipstick (In Office)2019 10:56:00
  * 



             Test Item    Value        Reference Range Interpretation Comments

 

             Glucose (test code = Glucose) 100                       A          

   

 

             LEUKOCYTES (test code = LEUKOCYTES) ++                        A    

         

 

             NITRITE; Normal (test code = 97126-5) Negative                  N  

           

 

             UROBILINOGEN; Normal (test code = 69647-8) Negative                

  N             

 

             PROTEIN; Abnormal (test code = 93173-5) Trace                     A

             

 

             pH (test code = pH) 5                                       

 

             URINE BLOOD; Normal (test code = 25133-8) Negative                 

 N             

 

             SPECIFIC GRAVITY (test code = 2965-2) 1.020                        

           

 

             KETONES; Normal (test code = 29514-5) Negative                  N  

           

 

             BILIRUBIN; Normal (test code = 18763-6) Negative                  N

             

 

             COLOR URINE; Normal (test code = 5778-6) Yellow                    

N             





[facility]] URINALYSIS, COMPLETE W/REFLEX TO CULTURE
2019 00:00:00* 



             Test Item    Value        Reference Range Interpretation Comments

 

             COLOR; Normal (test code = 5778-6) YELLOW       YELLOW       N     

        

 

             APPEARANCE (test code = APPEARANCE) CLOUDY       CLEAR        A    

         

 

             SPECIFIC GRAVITY; Normal (test code = 2965-2) 1.019        1.001-1.

035  N             

 

             PH; Normal (test code = 2756-5) < OR = 5.0   5.0-8.0      N        

     

 

             GLUCOSE; Abnormal (test code = 1547-9) TRACE        NEGATIVE     A 

            

 

             BILIRUBIN; Normal (test code = 51426-7) NEGATIVE     NEGATIVE     N

             

 

             KETONES; Normal (test code = 48869-8) NEGATIVE     NEGATIVE     N  

           

 

             OCCULT BLOOD; Normal (test code = 79450-9) NEGATIVE     NEGATIVE   

  N             

 

             PROTEIN; Normal (test code = 44429-0) NEGATIVE     NEGATIVE     N  

           

 

             NITRITE (test code = NITRITE) NEGATIVE     NEGATIVE     N          

   

 

             LEUKOCYTE ESTERASE (test code = LEUKOCYTE ESTERASE) 2+           NE

GATIVE     A             

 

             WBC; Abnormal (test code = 6690-2) 6-10         < OR = 5     A     

        

 

             RBC; Normal (test code = 789-8) 0-2          < OR = 2     N        

     

 

             SQUAMOUS EPITHELIAL CELLS (test code = 69403-5) 0-5          < OR =

 5                   

 

             TRANSITIONAL EPITHELIAL CELLS (test code = 10615-6) 0-5          < 

OR = 5                   

 

             BACTERIA; Abnormal (test code = 630-4) MANY         NONE SEEN    A 

            

 

             HYALINE CAST; Normal (test code = 18433-5) NONE SEEN    NONE SEEN  

  N             





[facility][] REFLEXIVE URINE [id number] 00:00:00* 



             Test Item    Value        Reference Range Interpretation Comments

 

                          REFLEXIVE URINE CULTURE (test code = REFLEXIVE URINE C

ULTURE) CULTURE INDICATED 

- RESULTS TO FOLLOW                                          





[facility]] CULTURE, URINE, [id number] 00:00:00* 



             Test Item    Value        Reference Range Interpretation Comments

 

             CULTURE (test code = CULTURE) See Comment                          

  CULTURE, URINE, ROUTINE     MICRO 

NUMBER:      [id number]  TEST STATUS:       FINAL  SPECIMEN SOURCE:   URINE  
SPECIMEN QUALITY:  ADEQUATE  RESULT:            Three or more organisms present,
 each greater                     than 10,000 cu/mL. May represent normal anthony 
                    contamination from external genitalia. No further           
          testing is required.





[facility] DX UNI [id number] 09:15:00  
                                                                                
    [facility]                        [address] [place] [address]      Patient Name: SUPRIYA MOULTON    
                               MR #: [id number]                     : 
951                                   Age/Sex: 68/F  Acct #: [id number]       
                       Req #: [id number]  Adm Physician:                        
                              Ordered by: CARLA DONG DO                  
          Report #: [id number]        Location: MAMMO                            
       Room/Bed:                     ___________________________________________
________________________________________________________    Procedure: [id number]
 MG/MAMMOGRAPHY DIGITAL DX UNI RT  Exam Date: 19                          
  Exam Time: 0845                                              REPORT STATUS: Si
ed       #[id number] - [id number]   #UNILATERAL RIGHT DIGITAL DIAGNOSTIC MAMMOG
MARIANA WITH SPOT COMPRESSION: 2019   Comparison is made to exam dated:  2018 mammogram - [facility].     Current study contains 
2 films.     There are scattered fibroglandular elements in the right breast.   
  There are benign calcifications in the right breast.  The density previously d
escribed appears to    press out.  No mass seen.    No significant masses, calci
fications, or other findings are seen in the breast.     There has been no signi
ficant interval change.      IMPRESSION: BENIGN   There is no mammographic evide
nce of malignancy.  A 1 year screening mammogram is recommended.    The patient 
will be notified by letter of the results.           Khadra Arguello Jr., D.O.     
        cw/:2019 12:33:36        Imaging Technologist: Astrid Daniels RT(R)
(M), [facility]   letter sent: Normal Exam     Mammogram
 BI-RADS: 2 Benign     Dictated By: KHADRA ARGUELLO DO  Electronically Signed By: 
KHADRA ARGUELLO DO on 19 1233  Transcribed By: LAUREN on 19 1233      
 COPY TO:   CARLA DONG DO        MAMMOGRAPHY DIGITAL SCR [id number] 
09:58:00                                                                        
              [place] [address]      Patient Name: 
SUPRIYA MOULTON                                   MR #: [id number]               
      : 1950                                   Age/Sex: 68/F  Acct #: 
[id number]                              Req #: [id number]  Adm Physician:     
                                                 Ordered by: CARLA DONG DO                            Report #: [id number]        Location: MAMMO        
                           Room/Bed:                     
_____________________________________________
______________________________________________________    Procedure: [id number] CHRISTIAN TAYLOR/MAMMOGRAPHY DIGITAL SCR BILAT  Exam Date: 18                            
Exam Time: 0916                                              REPORT STATUS: Sign
ed       #[id number] - [id number]   #BILATERAL DIGITAL SCREENING MAMMOGRAM WITH
 CAD: 2018   CLINICAL: Routine screening.        Comparison is made to nevin kumar dated:  2013 mammogram - [facility].  Current study    cont
ains 8 films.     There are scattered fibroglandular elements in both breasts.  
   Current study was also evaluated with a Computer Aided Detection (CAD) system
.     There is an irregular asymmetry in the right breast at 1 o'clock middle de
pth.  This is best noted    on the implant displacement views.  The implants reginald
ear intact.  Scattered benign appearing    calcifications are present bilaterall
y.      No other significant masses, calcifications, or other findings are seen 
in either breast.        IMPRESSION: INCOMPLETE: NEEDS ADDITIONAL IMAGING EVALUA
TION   The irregular asymmetry in the right breast is indeterminate.  Additional
 views with possible    ultrasound are recommended.        The patient will be c
ontacted by the Mammography Department to schedule this appointment.           ASIF Arguello Jr., D.O.             cw/:2019 14:00:01        Imaging Technolog
ist: Astrid KAT)(CHRISTIAN), [facility]   letter sent: 
Additional Imaging Needed     Mammogram BI-RADS: 0 Indeterminate     Dictated By
: KHADRA ARGUELLO DO  Electronically Signed By: KHADRA ARGUELLO DO on 19 1400 
 Transcribed By: LAUREN on 19 1400       COPY TO:   CARLA DONG DO   
     [O] Flu Test (in Office )2018 00:00:00* 



             Test Item    Value        Reference Range Interpretation Comments

 

             Flu A (test code = Flu A) negative                                

 

             Flu B (test code = Flu B) negative                                





[facility] ([facility] In Office)2018 
10:05:00* 



             Test Item    Value        Reference Range Interpretation Comments

 

             Glucose POC Lifescan (test code = Glucose POC Lifescan) 138        

                             





[facility][O] Hemoglobin A1c (in office)2018 10:05:00
  * 



             Test Item    Value        Reference Range Interpretation Comments

 

             HEMOGLOBIN A1c (test code = 4548-4) 6.4                            

         





[facility]] LIPID PANEL2018-10-30 08:40:00* 



             Test Item    Value        Reference Range Interpretation Comments

 

             CHOLESTEROL, TOTAL; Above High Threshold (test code = 2093-3) 240 m

g/dl    <200                       

 

             HDL CHOLESTEROL; Below Low Threshold (test code = 2085-9) 45 mg/dl 

    >50                        

 

             TRIGLYCERIDES; Above High Threshold (test code = 2571-8) 324 mg/dl 

   <150                       

 

                LDL-CHOLESTEROL; Above High Threshold (test code = 46418-1) 145 

{MG/DL  LALI}                  

                                        Reference range: <100 Desirable range <1

00 mg/dL for primary prevention;  <70 

mg/dL for patients with CHD or diabetic patients with > or = 2 CHD risk factors.
 LDL-C is now calculated using the Clarisa calculation, which is a 
validated novel method providing better accuracy than the Friedewald equation in
 the estimation of LDL-C. Lefty HARMON et al. LISETTE. 2013;310(19): 7080-3102 (http
://education.Interesante.com.Mapittrackit/faq/KPW941)

 

             CHOL/HDLC RATIO (test code = CHOL/HDLC RATIO) 5.3 {CALC}   <5.0    

                   

 

             NON HDL CHOLESTEROL (test code = NON HDL CHOLESTEROL) 195 {MG/DL  C

AL} <130                      

For patients with diabetes plus 1 major ASCVD risk factor, treating to a 
non-HDL-C goal of <100 mg/dL (LDL-C of <70 mg/dL) is considered a therapeutic 
option.





[facility]] CMP W/EGFR2018-10-30 08:40:00* 



             Test Item    Value        Reference Range Interpretation Comments

 

             GLUCOSE; Above High Threshold (test code = 1547-9) 130 mg/dl    65-

99                     Fasting 

reference interval For someone without known diabetes, a glucosevalue >125 mg/dL
 indicates that they may havediabetes and this should be confirmed with afollow-
up test.

 

             UREA NITROGEN (BUN) (test code = UREA NITROGEN (BUN)) 43 mg/dl     

7-25                       

 

             CREATININE (test code = CREATININE) 1.17 mg/dl   0.50-0.99         

        For patients >49 

years of age, the reference limitfor Creatinine is approximately 13% higher for 
peopleidentified as -American.

 

                          eGFR NON- (test code = eGFR NON-ZAIDA

N AMERICAN) 49 

{ML/MIN/1.7}        > OR = 60                                

 

                    eGFR  (test code = eGFR ) 56

 {ML/MIN/1.7}     > OR =

 60                                                  

 

             BUN/CREATININE RATIO (test code = BUN/CREATININE RATIO) 37 {CALC}  

  6-22                       

 

             SODIUM (test code = SODIUM) 143 mmol/L   135-146      N            

 

 

             POTASSIUM (test code = POTASSIUM) 4.5 mmol/L   3.5-5.3      N      

       

 

             CHLORIDE (test code = CHLORIDE) 105 mmol/L          N        

     

 

             CARBON DIOXIDE (test code = CARBON DIOXIDE) 27 mmol/L    20-32     

   N             

 

             CALCIUM (test code = CALCIUM) 10.6 mg/dl   8.6-10.4                

   

 

             PROTEIN, TOTAL (test code = PROTEIN, TOTAL) 6.9 g/dl     6.1-8.1   

   N             

 

             ALBUMIN (test code = ALBUMIN) 4.7 g/dl     3.6-5.1      N          

   

 

             GLOBULIN (test code = GLOBULIN) 2.2 {G/DL  CALC} 1.9-3.7      N    

         

 

                ALBUMIN/GLOBULIN RATIO (test code = ALBUMIN/GLOBULIN RATIO) 2.1 

{CALC}      1.0-2.5         N

                                         

 

             BILIRUBIN, TOTAL; Normal (test code = 98810-8) 0.5 mg/dl    0.2-1.2

      N             

 

             ALKALINE PHSPHATASE (test code = ALKALINE PHSPHATASE) 49 u/l       

       N             

 

             AST; Normal (test code = 1916-6) 17 u/l       10-35        N       

      

 

             ALT; Normal (test code = 1742-6) 8 u/l        6-29         N       

      





[facility]] MICROALBUMIN, RANDOM URINE (W/CREATININE)
2018-10-30 08:40:00* 



             Test Item    Value        Reference Range Interpretation Comments

 

                CREATININE, RANDOM URINE (test code = CREATININE, RANDOM URINE) 

69 mg/dl                  

N                                        

 

             MICROALBUMIN (test code = MICROALBUMIN) 10.6 mg/dl                N

            Reference RangeNot 

established

 

                                        MICROALBUMIN/CREATININE RATIO, RANDOM UR

INE (test code = MICROALBUMIN/CREATININE

 RATIO, RANDOM URINE) 154 {MCG/MG CRE} <30                             The ADA d

efines abnormalities in 

albuminexcretion as follows: Category         Result (mcg/mg creatinine) Normal 
                   <30Microalbuminuria          Clinical albuminuria   > 
OR = 300 The ADA recommends that at least two of threespecimens collected within
 a 3-6 month period beabnormal before considering a patient to bewithin a 
diagnostic category.





[facility] ([facility])2018 
13:07:00* 



             Test Item    Value        Reference Range Interpretation Comments

 

             Glucose POC Lifescan (test code = Glucose POC Lifescan) 131        

                             





[facility][O] Hemoglobin A1c (in office)2018 13:07:00
  * 



             Test Item    Value        Reference Range Interpretation Comments

 

             HEMOGLOBIN A1c (test code = 4548-4) 7.3                            

         





[facility]] LIPID [id number] 08:04:00* 



             Test Item    Value        Reference Range Interpretation Comments

 

             CHOLESTEROL, TOTAL; Above High Threshold (test code = 2093-3) 283 m

g/dl    <200                       

 

             HDL CHOLESTEROL; Below Low Threshold (test code = 2085-9) 37 mg/dl 

    >50                        

 

             TRIGLYCERIDES; Above High Threshold (test code = 2571-8) 754 mg/dl 

   <150                       

 

             LDL-CHOLESTEROL (test code = 72654-5) See Comment                  

          LDL cholesterol not 

calculated. Triglyceride levelsgreater than 400 mg/dL invalidate calculated LDL 
results. Reference range: <100 Desirable range <100 mg/dL for primary 
prevention;  <70 mg/dL for patients with CHD or diabetic patients with > or = 2 
CHD risk factors. LDL-C is now calculated using the Clarisa calculation, 
which is a validated novel method providing better accuracy than the Friedewald 
equation in the estimation of LDL-C. Lefty HARMON et al. LISETTE. 2013;310(19): 2061-
2068 (http://education.Wiseryou/faq/EAW692)

 

             CHOL/HDLC RATIO (test code = CHOL/HDLC RATIO) 7.6 {CALC}   <5.0    

                   

 

             NON HDL CHOLESTEROL (test code = NON HDL CHOLESTEROL) 246 {MG/DL  C

AL} <130                      

Non-HDL level > or = 220 is very high and may indicate genetic familial 
hypercholesterolemia (FH). Clinical assessment and measurement of blood lipid 
levels should be considered for all first-degree relatives of patients with an 
FH diagnosis. For patients with diabetes plus 1 major ASCVD risk factor, 
treating to a non-HDL-C goal of <100 mg/dL (LDL-C of <70 mg/dL) is considered a 
therapeutic option.





[facility]] CMP [id number] 08:04:00* 



             Test Item    Value        Reference Range Interpretation Comments

 

             GLUCOSE; Above High Threshold (test code = 1547-9) 136 mg/dl    65-

99                     Fasting 

reference interval For someone without known diabetes, a glucosevalue >125 mg/dL
 indicates that they may havediabetes and this should be confirmed with afollow-
up test.

 

             UREA NITROGEN (BUN) (test code = UREA NITROGEN (BUN)) 45 mg/dl     

7-25                       

 

             CREATININE (test code = CREATININE) 1.03 mg/dl   0.50-0.99         

        For patients >49 

years of age, the reference limitfor Creatinine is approximately 13% higher for 
peopleidentified as -American.

 

                          eGFR NON- (test code = eGFR NON-ZAIDA

N AMERICAN) 57 

{ML/MIN/1.7}        > OR = 60                                

 

                    eGFR  (test code = eGFR ) 66

 {ML/MIN/1.7}     > OR =

 60                       N                          

 

             BUN/CREATININE RATIO (test code = BUN/CREATININE RATIO) 44 {CALC}  

  6-22                       

 

             SODIUM (test code = SODIUM) 140 mmol/L   135-146      N            

 

 

             POTASSIUM (test code = POTASSIUM) 4.7 mmol/L   3.5-5.3      N      

       

 

             CHLORIDE (test code = CHLORIDE) 109 mmol/L          N        

     

 

             CARBON DIOXIDE (test code = CARBON DIOXIDE) 21 mmol/L    20-31     

   N             

 

             CALCIUM (test code = CALCIUM) 10.2 mg/dl   8.6-10.4     N          

   

 

             PROTEIN, TOTAL (test code = PROTEIN, TOTAL) 7.0 g/dl     6.1-8.1   

   N             

 

             ALBUMIN (test code = ALBUMIN) 4.7 g/dl     3.6-5.1      N          

   

 

             GLOBULIN (test code = GLOBULIN) 2.3 {G/DL  CALC} 1.9-3.7      N    

         

 

                ALBUMIN/GLOBULIN RATIO (test code = ALBUMIN/GLOBULIN RATIO) 2.0 

{CALC}      1.0-2.5         N

                                         

 

             BILIRUBIN, TOTAL; Normal (test code = 44711-3) 0.6 mg/dl    0.2-1.2

      N             

 

             ALKALINE PHSPHATASE (test code = ALKALINE PHSPHATASE) 84 u/l       

       N             

 

             AST; Normal (test code = 1916-6) 17 u/l       10-35        N       

      

 

             ALT; Normal (test code = 1742-6) 10 u/l       6-29         N       

      





[facility]] MICROALBUMIN, RANDOM URINE (W/CREATININE)
2018 08:04:00* 



             Test Item    Value        Reference Range Interpretation Comments

 

                CREATININE, RANDOM URINE (test code = CREATININE, RANDOM URINE) 

90 mg/dl                  

N                                        

 

             MICROALBUMIN (test code = MICROALBUMIN) 10.1 mg/dl                N

            Reference RangeNot 

established

 

                                        MICROALBUMIN/CREATININE RATIO, RANDOM UR

INE (test code = MICROALBUMIN/CREATININE

 RATIO, RANDOM URINE) 112 {MCG/MG CRE} <30                             The ADA d

efines abnormalities in 

albuminexcretion as follows: Category         Result (mcg/mg creatinine) Normal 
                   <30Microalbuminuria          Clinical albuminuria   > 
OR = 300 The ADA recommends that at least two of threespecimens collected within
 a 3-6 month period beabnormal before considering a patient to bewithin a 
diagnostic category.





[facility][O] Hemoglobin A1c (in office)2018 11:33:00
  * 



             Test Item    Value        Reference Range Interpretation Comments

 

             HEMOGLOBIN A1c (test code = 4548-4) 6.3                            

         





[facility]2018 
11:32:00* 



             Test Item    Value        Reference Range Interpretation Comments

 

             Glucose POC Lifescan (test code = Glucose POC Lifescan) 101        

                             





[facility][O] Hemoglobin A1c (in office)2018 11:55:00
  * 



             Test Item    Value        Reference Range Interpretation Comments

 

             HEMOGLOBIN A1c (test code = 4548-4) 8.1                            

         





[facility])2018 
11:53:00* 



             Test Item    Value        Reference Range Interpretation Comments

 

             Glucose POC Lifescan (test code = Glucose POC Lifescan) 216        

                             





[facility]

## 2020-09-28 VITALS — SYSTOLIC BLOOD PRESSURE: 169 MMHG | DIASTOLIC BLOOD PRESSURE: 80 MMHG

## 2020-09-28 VITALS — DIASTOLIC BLOOD PRESSURE: 85 MMHG | SYSTOLIC BLOOD PRESSURE: 169 MMHG

## 2020-09-28 VITALS — SYSTOLIC BLOOD PRESSURE: 170 MMHG | DIASTOLIC BLOOD PRESSURE: 89 MMHG

## 2020-09-28 VITALS — DIASTOLIC BLOOD PRESSURE: 96 MMHG | SYSTOLIC BLOOD PRESSURE: 139 MMHG

## 2020-09-28 VITALS — SYSTOLIC BLOOD PRESSURE: 141 MMHG | DIASTOLIC BLOOD PRESSURE: 71 MMHG

## 2020-09-28 VITALS — SYSTOLIC BLOOD PRESSURE: 165 MMHG | DIASTOLIC BLOOD PRESSURE: 91 MMHG

## 2020-09-28 LAB
ALBUMIN SERPL-MCNC: 4.1 G/DL (ref 3.5–5)
ALBUMIN/GLOB SERPL: 1.4 {RATIO} (ref 0.8–2)
ALP SERPL-CCNC: 85 IU/L (ref 40–150)
ALT SERPL-CCNC: 9 IU/L (ref 0–55)
ANION GAP SERPL CALC-SCNC: 17.1 MMOL/L (ref 8–16)
BASOPHILS # BLD AUTO: 0 10*3/UL (ref 0–0.1)
BASOPHILS NFR BLD AUTO: 0.9 % (ref 0–1)
BUN SERPL-MCNC: 37 MG/DL (ref 7–26)
BUN/CREAT SERPL: 32 (ref 6–25)
CALCIUM SERPL-MCNC: 10.2 MG/DL (ref 8.4–10.2)
CHLORIDE SERPL-SCNC: 107 MMOL/L (ref 98–107)
CHOLEST SERPL-MCNC: 341 MD/DL (ref 0–199)
CHOLEST/HDLC SERPL: 9.2 {RATIO} (ref 3–3.6)
CK MB SERPL-MCNC: 1.7 NG/ML (ref 0–5)
CK MB SERPL-MCNC: 2.1 NG/ML (ref 0–5)
CK MB SERPL-MCNC: 2.3 NG/ML (ref 0–5)
CK SERPL-CCNC: 124 IU/L (ref 29–168)
CK SERPL-CCNC: 125 IU/L (ref 29–168)
CK SERPL-CCNC: 127 IU/L (ref 29–168)
CO2 SERPL-SCNC: 19 MMOL/L (ref 22–29)
DEPRECATED NEUTROPHILS # BLD AUTO: 2.6 10*3/UL (ref 2.1–6.9)
DEPRECATED PHOSPHATE SERPL-MCNC: 2.9 MG/DL (ref 2.3–4.7)
EGFRCR SERPLBLD CKD-EPI 2021: 47 ML/MIN (ref 60–?)
EOSINOPHIL # BLD AUTO: 0.1 10*3/UL (ref 0–0.4)
EOSINOPHIL NFR BLD AUTO: 1.8 % (ref 0–6)
ERYTHROCYTE [DISTWIDTH] IN CORD BLOOD: 14.2 % (ref 11.7–14.4)
GLOBULIN PLAS-MCNC: 2.9 G/DL (ref 2.3–3.5)
GLUCOSE SERPLBLD-MCNC: 179 MG/DL (ref 74–118)
HCT VFR BLD AUTO: 34.6 % (ref 34.2–44.1)
HDLC SERPL-MSCNC: 37 MG/DL (ref 40–60)
HGB BLD-MCNC: 11.5 G/DL (ref 12–16)
LYMPHOCYTES # BLD: 1.1 10*3/UL (ref 1–3.2)
LYMPHOCYTES NFR BLD AUTO: 24.2 % (ref 18–39.1)
MAGNESIUM SERPL-MCNC: 1.6 MG/DL (ref 1.3–2.1)
MCH RBC QN AUTO: 29.6 PG (ref 28–32)
MCHC RBC AUTO-ENTMCNC: 33.2 G/DL (ref 31–35)
MCV RBC AUTO: 88.9 FL (ref 81–99)
MONOCYTES # BLD AUTO: 0.6 10*3/UL (ref 0.2–0.8)
MONOCYTES NFR BLD AUTO: 12.8 % (ref 4.4–11.3)
NEUTS SEG NFR BLD AUTO: 59.4 % (ref 38.7–80)
PLATELET # BLD AUTO: 211 X10E3/UL (ref 140–360)
POTASSIUM SERPL-SCNC: 4.1 MMOL/L (ref 3.5–5.1)
RBC # BLD AUTO: 3.89 X10E6/UL (ref 3.6–5.1)
SODIUM SERPL-SCNC: 139 MMOL/L (ref 136–145)
TRIGL SERPL-MCNC: 1662 MG/DL (ref 0–149)
TSH SERPL DL<=0.005 MIU/L-ACNC: 3.38 UIU/ML (ref 0.35–4.94)

## 2020-09-28 PROCEDURE — 027034Z DILATION OF CORONARY ARTERY, ONE ARTERY WITH DRUG-ELUTING INTRALUMINAL DEVICE, PERCUTANEOUS APPROACH: ICD-10-PCS | Performed by: INTERNAL MEDICINE

## 2020-09-28 PROCEDURE — 4A023N7 MEASUREMENT OF CARDIAC SAMPLING AND PRESSURE, LEFT HEART, PERCUTANEOUS APPROACH: ICD-10-PCS | Performed by: INTERNAL MEDICINE

## 2020-09-28 PROCEDURE — B2111ZZ FLUOROSCOPY OF MULTIPLE CORONARY ARTERIES USING LOW OSMOLAR CONTRAST: ICD-10-PCS | Performed by: INTERNAL MEDICINE

## 2020-09-28 RX ADMIN — DOCUSATE SODIUM SCH MG: 100 TABLET, FILM COATED ORAL at 17:21

## 2020-09-28 RX ADMIN — FAMOTIDINE SCH MG: 10 INJECTION, SOLUTION INTRAVENOUS at 17:21

## 2020-09-28 RX ADMIN — DOCUSATE SODIUM SCH MG: 100 TABLET, FILM COATED ORAL at 09:00

## 2020-09-28 RX ADMIN — ASPIRIN SCH MG: 81 TABLET, COATED ORAL at 08:25

## 2020-09-28 RX ADMIN — INSULIN HUMAN SCH UNIT: 100 INJECTION, SOLUTION PARENTERAL at 21:00

## 2020-09-28 RX ADMIN — NITROGLYCERIN SCH GM: 20 OINTMENT TOPICAL at 01:38

## 2020-09-28 RX ADMIN — INSULIN HUMAN SCH UNIT: 100 INJECTION, SOLUTION PARENTERAL at 17:21

## 2020-09-28 RX ADMIN — NITROGLYCERIN SCH GM: 20 OINTMENT TOPICAL at 11:56

## 2020-09-28 RX ADMIN — NITROGLYCERIN SCH GM: 20 OINTMENT TOPICAL at 06:15

## 2020-09-28 RX ADMIN — FENOFIBRATE SCH MG: 145 TABLET ORAL at 13:00

## 2020-09-28 RX ADMIN — SODIUM CHLORIDE SCH MLS/HR: 9 INJECTION, SOLUTION INTRAVENOUS at 16:30

## 2020-09-28 RX ADMIN — CARVEDILOL SCH MG: 12.5 TABLET, FILM COATED ORAL at 17:22

## 2020-09-28 RX ADMIN — Medication PRN MG: at 06:15

## 2020-09-28 RX ADMIN — INSULIN HUMAN SCH UNIT: 100 INJECTION, SOLUTION PARENTERAL at 11:50

## 2020-09-28 RX ADMIN — FAMOTIDINE SCH MG: 10 INJECTION, SOLUTION INTRAVENOUS at 08:24

## 2020-09-28 RX ADMIN — INSULIN HUMAN SCH UNIT: 100 INJECTION, SOLUTION PARENTERAL at 08:15

## 2020-09-28 RX ADMIN — Medication PRN MG: at 17:22

## 2020-09-28 NOTE — NUR
PATIENT AMBULATED TO BATHROOM WITHOUT ANY DIFFICULTY.

RECEIVED REPORT FROM [facility]. PATIENT LEFT WRIST IV PATENT, GOOD BLOOD 
RETURN

PATIENT A/0X3. OBTAINED MEDICATION LIST. UPDATED ON PLAN.

## 2020-09-28 NOTE — CONSULTATION
DATE OF CONSULTATION:  2020

 

Cardiac Consultation 

 

REASON FOR CONSULTATION:  Unstable coronary syndrome.

 

HISTORY OF PRESENT ILLNESS:  This is a 69-year-old lady who is known with hypertension,

diabetes mellitus, hypercholesteremia, obesity, status post gastric bypass surgery

followed by tummy tuck. 

 

Regarding her coronary artery disease, her problem started in May 2015, where she had

PCI and stenting of the LAD.  Also patient later on in September, she had PCI to the

right coronary artery.  Her other problem is sleep apnea.  The patient came to here

having typical anginal symptoms.  She is having quite a lot of stress, pain, shortness

of breath, chest tightness, chest pressure.  She denied having any orthopnea or

paroxysmal nocturnal dyspnea.  Her symptoms were very severe, so she checked herself

into the ER.  Her cardiac enzymes, the 1st and 2nd one are normal.  However, patient

continued to complain of severe chest pain.  Urgent cardiac consultation is obtained. 

 

HOME MEDICATIONS:  

1. Fenofibrate 54 mg a day.

2. Carvedilol 25 mg twice a day.

3. Plavix 75 mg a day.

4. Irbesartan 300 mg a day.

5. Novolin R and N.

6. Metformin.

7. Advil.

 

ALLERGIES:  STATIN CAUSE MYALGIA.

 

PAST MEDICAL HISTORY:  

1. PCI and stenting in May 2015 to the LAD, PCI and stenting of the RCA in 2015, anterior MI in May 2015. 

2. Hypertension.

3. Diabetes mellitus with complication.

4. Hypercholesterolemia.

5. Obstructive sleep apnea.

6. History of gastric bypass surgery.

7. Gout.

8. Back pain with sciatica nerve pain.

9. Sleep apnea.

10. Narcolepsy.

11.  twice.

12. Kidney stone.

13. Breast surgery.

14. Right Achilles tendon repair.

15. Lap band surgery.

 

SOCIAL HISTORY:  She is .  She has stopped smoking.  She is non-alcohol drinker.

She is retired dental hygienist. 

 

FAMILY HISTORY:  Father  at age 65 with heart disease.  Mother  at age 83 with

diabetes mellitus complication.  Two sisters.  No brother.  One sister does have uterine

cancer.  Two children, one son, one daughter. 

 

REVIEW OF SYSTEMS:

GENERAL:  No fever, no chills. 

HEENT:  Remarkable for occasional vertigo. 

ENDOCRINE:  The patient is diabetic with severe end-organ damage. 

CARDIAC AND PULMONARY:  As per above. 

GI:  Occasional heartburn indigestion. 

:  Increased frequency of urination.  History of kidney stone. 

MUSCULOSKELETAL:  Frequent gout flare-up, muscle aches. 

SKIN:  Rashes on both arm. 

NEUROLOGY:  Sleep apnea.

 

PHYSICAL EXAMINATION:

GENERAL:  A well-built lady, in no acute distress.  Height of 5 feet, weight of 174

pounds. 

HEENT:  Pupils are reactive. 

NECK:  No elevation of jugular venous pulsation. 

CHEST:  Clear to auscultation and percussion. 

HEART:  PMI 5th left intercostal space.  Normal first and second heart sounds.  Scar is

noted. 

ABDOMEN:  Soft.  No organomegaly.  No abdominal bruits. 

EXTREMITIES:  No cyanosis, no clubbing, no edema. 

NEUROLOGIC:  Awake, alert, and oriented.  No motor deficits.

IMPRESSION AND PLAN:  

1. Coronary artery disease with acute unstable angina.

2. History of myocardial infarction, status post percutaneous coronary intervention

twice. 

3. Diabetes mellitus with end-organ damage.

4. Hyperlipidemia.

5. Hypertension.

6. Diabetes mellitus.

7. Abnormal EKG. 

We discussed options of workup, the patient really continued to have chest pain, so we

again proceed with urgent cardiac catheterization.  We will put the patient n.p.o. from

now and we will schedule for her procedure.  Procedure risks, benefits, and alternatives

are discussed and explained. 

 

 

 

 

______________________________

MD STANLEY Winn/MODL

D:  2020 12:56:04

T:  2020 18:06:46

Job #:  [id number]

## 2020-09-28 NOTE — OPERATIVE REPORT
DATE OF PROCEDURE:  09/28/2020

 

SURGEON:  George Meneses MD

 

TITLE OF PROCEDURE:  Cardiac catheterization and percutaneous coronary intervention.

 

PATIENT CLINICAL PROFILE:  This is a 69-year-old female with a past medical history of

coronary artery atherosclerosis with prior stenting to the LAD as well as to the RCA,

prior myocardial infarction x2, hypertension, diabetes mellitus, who presented to the

emergency department in the setting of unable angina for 18-hour duration, and

describing significant chest discomfort and chest tightness, typical of her prior

episodes of myocardial ischemia, thus the patient was taken urgently to the cardiac

catheterization lab for further evaluation of her coronary artery as well as cardiac

status. 

 

PREPROCEDURE DIAGNOSES:  

1. Coronary artery atherosclerosis with unstable angina.

2. Hypertension.

3. Diabetes mellitus, type 2.

4. Hyperlipidemia.

 

POSTPROCEDURE DIAGNOSES:  

1. Coronary artery atherosclerosis with unstable angina.

2. Hypertension.

3. Diabetes mellitus, type 2.

4. Hyperlipidemia.

 

PROCEDURES PERFORMED:  Selective coronary angiography, percutaneous coronary

intervention of the proximal LAD with a 3.0 x 18 mm Resolute Monon drug-eluting stent,

deployed at 15 atmospheres pressure. 

 

FIRST ASSISTANT:  Hoa Meneses MD

 

ANESTHESIA USED:  Moderate sedation.

 

DESCRIPTION OF PROCEDURE:  The patient was taken to the cardiac catheterization lab with

prepping of bilateral groins as per standard fashion.  Risks, benefits, and alternatives

were discussed with the patient and she was agreeable to proceed with coronary

angiography and possible percutaneous coronary intervention after giving the patient

moderate sedation, her vitals, oxygen saturation, capnography, and telemetry were

monitored for a period greater than 15-minute duration by myself as well as by the

circulating staff.  Ultrasound guidance was used to gain access in the right groin and

the right common femoral artery with adequate position, confirmed on fluoroscopy.  We

then subsequently advanced a 4-Bolivian sheath and the patient underwent coronary

angiography in standard fashion.  First initially with a JL4 4-Bolivian catheter to engage

left main selectively and subsequently with a 3DRC 4-Bolivian catheter to engage the RCA

selectively, standard orthogonal views were obtained, and the patient tolerated this

portion of the procedure well. 

 

FINDINGS:  

1. Left main coronary artery has minimal luminal irregularities.

2. Circumflex coronary artery is nondominant with approximately 70% more distal stenosis

past onset of large obtuse marginal branch. 

3. The LAD artery has approximately 85% stenosis, just proximal to a previously placed

and to the mid section, there is approximately 40% stenosis as well as minimal luminal

irregularities in the mid distal portions of the LAD artery. 

4. The right coronary artery is a dominant artery which has a proximal very tight 95%

stenosis, and distal to this in the mid RCA is a patent stent, and at the end of these

stents, there is a 100% chronic appearing occlusion with no ipsilateral collaterals

visualized, however, there are take off at least 3 marginal branches noted.  The distal

RPDA and RPL territories filled via collaterals from the left system. 

Given the findings above, we decided that the RCA was likely chronic in nature, given

the mature collaterals as well as no contrast staining and we decided to undergo PCI to

the proximal LAD, subsequently Angiomax was given for anticoagulation, and we exchanged

our 4-Bolivian right CFA sheath for a 6-Bolivian sheath and we went up with a XB3.5 guide

catheter and engaged the left main coronary artery. We then subsequently used 0.014-inch

Runthrough coronary wire and we advanced this into the distal LAD.  We used a 2.5 x 15

mm Semi-compliant Balloon to perform angioplasty of the 85% proximal LAD lesion at 14

atmospheres of pressure and this yielded nicely.  We then subsequently advanced a 3.0 x

18 mm Resolute drug-eluting stent Monon and stented this area proximal to the prior mid

LAD stents, and we stented to the proximal aspect to the LAD.  We then postdilated the

overlap with the Stent Delivery System and we subsequently withdrew this an angiography

was adequate and showed a pre-WILLIAM-3 85% stenosis to post WILLIAM-3 0% residual stenosis.

We then decided to attempt to wire the RCA lesion in case this was not a , so we

advanced a 3DRC 6-Bolivian catheter guide catheter and initially wired into one of the

marginal branches at the distal aspect of the lumen and then we took a  50 coronary

wire and we attempted to probe the proximal , however, found that the ambiguous cap

as well as the multiple branches arising just proximal to  made wiring difficult, as

such we decided to seize the case at that time, all of her intervention equipment was

removed and then a 6-Bolivian Angio-Seal was deployed to the right common femoral artery

access with adequate hemostasis. 

 

DISPOSITION:  Return the patient to the nursing unit and monitored for growing

complications. 

 

ESTIMATED BLOOD LOSS:  5 mL.

 

CONDITION:  Stable.

 

COMPLICATIONS:  None.

 

SPECIMENS:  None.

 

PLAN:  Continue aspirin and Plavix therapy as well as cardiac rehabilitation and

aggressive risk factor modification. 

 

 

 

 

______________________________

MD ALYSSA Hurd/MODL

D:  09/28/2020 17:18:07

T:  09/28/2020 18:59:32

Job #:  [id number]

## 2020-09-29 VITALS — DIASTOLIC BLOOD PRESSURE: 58 MMHG | SYSTOLIC BLOOD PRESSURE: 128 MMHG

## 2020-09-29 VITALS — SYSTOLIC BLOOD PRESSURE: 105 MMHG | DIASTOLIC BLOOD PRESSURE: 63 MMHG

## 2020-09-29 VITALS — DIASTOLIC BLOOD PRESSURE: 52 MMHG | SYSTOLIC BLOOD PRESSURE: 134 MMHG

## 2020-09-29 VITALS — SYSTOLIC BLOOD PRESSURE: 134 MMHG | DIASTOLIC BLOOD PRESSURE: 52 MMHG

## 2020-09-29 LAB
ALBUMIN SERPL-MCNC: 3.9 G/DL (ref 3.5–5)
ALBUMIN/GLOB SERPL: 1.6 {RATIO} (ref 0.8–2)
ALP SERPL-CCNC: 66 IU/L (ref 40–150)
ALT SERPL-CCNC: 7 IU/L (ref 0–55)
ANION GAP SERPL CALC-SCNC: 16.1 MMOL/L (ref 8–16)
BASOPHILS # BLD AUTO: 0 10*3/UL (ref 0–0.1)
BASOPHILS NFR BLD AUTO: 0.5 % (ref 0–1)
BUN SERPL-MCNC: 32 MG/DL (ref 7–26)
BUN/CREAT SERPL: 25 (ref 6–25)
CALCIUM SERPL-MCNC: 9.4 MG/DL (ref 8.4–10.2)
CHLORIDE SERPL-SCNC: 108 MMOL/L (ref 98–107)
CHOLEST SERPL-MCNC: 304 MD/DL (ref 0–199)
CHOLEST/HDLC SERPL: 8.4 {RATIO} (ref 3–3.6)
CO2 SERPL-SCNC: 19 MMOL/L (ref 22–29)
DEPRECATED NEUTROPHILS # BLD AUTO: 4.3 10*3/UL (ref 2.1–6.9)
EGFRCR SERPLBLD CKD-EPI 2021: 41 ML/MIN (ref 60–?)
EOSINOPHIL # BLD AUTO: 0.1 10*3/UL (ref 0–0.4)
EOSINOPHIL NFR BLD AUTO: 1.6 % (ref 0–6)
ERYTHROCYTE [DISTWIDTH] IN CORD BLOOD: 14.2 % (ref 11.7–14.4)
GLOBULIN PLAS-MCNC: 2.4 G/DL (ref 2.3–3.5)
GLUCOSE SERPLBLD-MCNC: 190 MG/DL (ref 74–118)
HCT VFR BLD AUTO: 32.5 % (ref 34.2–44.1)
HDLC SERPL-MSCNC: 36 MG/DL (ref 40–60)
HGB BLD-MCNC: 10.7 G/DL (ref 12–16)
LYMPHOCYTES # BLD: 0.8 10*3/UL (ref 1–3.2)
LYMPHOCYTES NFR BLD AUTO: 13.6 % (ref 18–39.1)
MCH RBC QN AUTO: 29.2 PG (ref 28–32)
MCHC RBC AUTO-ENTMCNC: 32.9 G/DL (ref 31–35)
MCV RBC AUTO: 88.6 FL (ref 81–99)
MONOCYTES # BLD AUTO: 0.6 10*3/UL (ref 0.2–0.8)
MONOCYTES NFR BLD AUTO: 10.4 % (ref 4.4–11.3)
NEUTS SEG NFR BLD AUTO: 73.4 % (ref 38.7–80)
PLATELET # BLD AUTO: 188 X10E3/UL (ref 140–360)
POTASSIUM SERPL-SCNC: 4.1 MMOL/L (ref 3.5–5.1)
RBC # BLD AUTO: 3.67 X10E6/UL (ref 3.6–5.1)
SODIUM SERPL-SCNC: 139 MMOL/L (ref 136–145)
TRIGL SERPL-MCNC: 1120 MG/DL (ref 0–149)
TSH SERPL DL<=0.005 MIU/L-ACNC: 1.63 UIU/ML (ref 0.35–4.94)

## 2020-09-29 RX ADMIN — SODIUM CHLORIDE SCH MLS/HR: 9 INJECTION, SOLUTION INTRAVENOUS at 03:30

## 2020-09-29 RX ADMIN — CARVEDILOL SCH MG: 12.5 TABLET, FILM COATED ORAL at 08:11

## 2020-09-29 RX ADMIN — FENOFIBRATE SCH MG: 145 TABLET ORAL at 08:11

## 2020-09-29 RX ADMIN — ASPIRIN SCH MG: 81 TABLET, COATED ORAL at 08:08

## 2020-09-29 RX ADMIN — SODIUM CHLORIDE SCH MLS/HR: 9 INJECTION, SOLUTION INTRAVENOUS at 12:30

## 2020-09-29 RX ADMIN — INSULIN HUMAN SCH UNIT: 100 INJECTION, SOLUTION PARENTERAL at 08:14

## 2020-09-29 RX ADMIN — FAMOTIDINE SCH MG: 10 INJECTION, SOLUTION INTRAVENOUS at 08:08

## 2020-09-29 RX ADMIN — DOCUSATE SODIUM SCH MG: 100 TABLET, FILM COATED ORAL at 08:08

## 2020-09-29 RX ADMIN — INSULIN HUMAN SCH UNIT: 100 INJECTION, SOLUTION PARENTERAL at 11:56

## 2020-09-29 NOTE — DISCHARGE SUMMARY
CONSULTING PHYSICIAN:  Dr. Meneses with Cardiology.

 

CHIEF COMPLAINT:  Chest pain.

 

HISTORY OF PRESENT ILLNESS:  The patient is a 69-year-old female who was admitted with

complaints of substernal chest pain described as pressure that began around 6:00 p.m. on

.  She had associated dizziness, shortness of breath and diaphoresis.  Per Dr. Meneses's consultation note, her problem started in May 2015 when she had PCI and

stenting of the LAD.  In September, she had PCI to the right coronary artery.  She also

has sleep apnea.  She was having typical and anginal symptoms here with a lot of stress,

pain, shortness of breath, chest tightness, and chest pressure.  She denied having any

orthopnea or paroxysmal nocturnal dyspnea.  Her symptoms were severe, so she checked

herself into the emergency department.  All of her cardiac enzymes are negative.

However, the patient continued to complain. 

 

PAST MEDICAL HISTORY:  Type 2 diabetes mellitus, hypertension, coronary artery disease

with stents, hyperlipidemia, and gastroesophageal reflux disease.  Anterior MI in May

2015, obstructive sleep apnea, gout, back pain with sciatica nerve pain, narcolepsy, and

kidney stone. 

 

PAST SURGICAL HISTORY:  Leg surgery, lap band with removal, left foot neuroma removed,

PCI and stenting in May 2015 to the LAD, PCI and stenting of the right RCA in 2015, right Achilles tendon repair, breast surgery,  twice, and gastric bypass

surgery. 

 

FAMILY HISTORY:  Mother and grandmother had diabetes.  Sister had cerebrovascular

accident. 

 

SOCIAL HISTORY:  She is .  She stops smoking.  She denies use of alcohol or

illicit drugs.  She is a retired dental hygienist. 

 

ADMITTING DIAGNOSES:  

1. Chest pain.

2. Type 2 diabetes mellitus.

3. Hypertension.

4. Acute kidney injury.

5. Hyperlipidemia.

6. Obesity with BMI 32.6.

 

DISCHARGE DIAGNOSES:  

1. Coronary artery disease with acute unstable angina.

2. History of myocardial infarction, status post PCI twice.

3. Controlled type 2 diabetes mellitus with hemoglobin A1c 7.5%.

4. Abnormal EKG.

5. Obstructive sleep apnea.

6. Gastroesophageal reflux disease.

7. Obesity with BMI 32.6.

 

LABORATORY DATA:  The patient's lipids today, triglycerides 1120, cholesterol 304, and

HDL 36.  Her TSH was 1.630.  All troponin Is within normal limits x4.  Today, her sodium

was 139, potassium 4.1, chloride 108, CO2 of 19, BUN 32, creatinine 1.29, estimated GFR

41, and glucose 190.  Fingerstick blood glucose 213.  Hemoglobin A1c 7.5%.  Calcium 9.4,

total bilirubin 0.3, AST 13, ALT 7, alkaline phosphatase 66, total protein 6.3, and

albumin 3.9.  WBC 5.79, hemoglobin 10.7, hematocrit 32.5, platelets 188, and neutrophils

73.4.  Coronavirus PCR was not detected. 

 

IMAGING DATA:  Chest x-ray showed no acute thoracic abnormality.  A 12-lead EKG did show

normal sinus rhythm with a ventricular rate of 74 today.  Admitting 12-lead EKG showed

sinus tachycardia with first-degree AV block, ventricular rate 102.  The patient

underwent cardiac catheterization and percutaneous coronary intervention.  Left main

coronary artery had minimal luminal irregularities.  Circumflex coronary artery was

nondominant with approximately 70% more distal stenosis past onset of large obtuse

marginal branch.  LAD artery had approximately 85% stenosis just proximal to the

previously placed stent to the mid section, there is approximately 40% stenosis as well

as a minimal luminal irregularities in the mid distal portions of the LAD artery. 

 

PLAN:  The patient's plan is to continue aspirin and Plavix therapy as well as cardiac

rehabilitation and aggressive risk factor modification.  She can continue on her

[employer] diet.  Activity level as tolerated.  The patient states

her shortness of breath is improved. 

 

PHYSICAL EXAMINATION:

GENERAL:  She is no acute distress. 

LUNGS:  Clear to auscultation.  Respiratory pattern, even and unlabored. 

HEENT:  EOMI. 

NECK:  Supple.  No lymphadenopathy, thyromegaly, or JVD. 

CARDIOVASCULAR:  Regular rate and rhythm without murmur. 

Normal saline infusing at 100 mL an hour to a peripheral IV. 

ABDOMEN:  Bowel sounds positive.  Soft, obese.  No obvious distention. 

EXTREMITIES:  No pitting edema.  No clubbing, cyanosis, or signs of DVT. 

NEUROLOGICAL:  GCS 15.  Nonfocal.

FOLLOWUP:  The patient to follow up with her PCP, Dr. Kayla Paz in 1-2 weeks.

Follow up with Dr. Meneses as directed.  The patient is to call his office to set up an

appointment. 

 

DISCHARGE MEDICATIONS:  The patient will continue with most of her home medications.

New prescriptions include Lipitor 20 mg at bedtime, carvedilol will be increased from

12.5-25 mg b.i.d., clonidine 0.1 mg p.o. every 6 hours p.r.n. for elevated blood

pressure greater than 150, Colace 100 mg b.i.d., fenofibrate 145 mg tablet daily. 

 

 

Dictated by Humberto Browne, NP

 

______________________________

MD SHAD Nails/MODL

D:  2020 14:58:49

T:  2020 17:18:57

Job #:  [id number]

## 2021-08-24 ENCOUNTER — HOSPITAL ENCOUNTER (INPATIENT)
Dept: HOSPITAL 88 - ER | Age: 71
LOS: 1 days | Discharge: HOME | DRG: 247 | End: 2021-08-25
Attending: INTERNAL MEDICINE | Admitting: INTERNAL MEDICINE
Payer: MEDICARE

## 2021-08-24 VITALS — DIASTOLIC BLOOD PRESSURE: 65 MMHG | SYSTOLIC BLOOD PRESSURE: 129 MMHG

## 2021-08-24 VITALS — SYSTOLIC BLOOD PRESSURE: 143 MMHG | DIASTOLIC BLOOD PRESSURE: 69 MMHG

## 2021-08-24 VITALS — SYSTOLIC BLOOD PRESSURE: 121 MMHG | DIASTOLIC BLOOD PRESSURE: 64 MMHG

## 2021-08-24 VITALS — SYSTOLIC BLOOD PRESSURE: 173 MMHG | DIASTOLIC BLOOD PRESSURE: 90 MMHG

## 2021-08-24 VITALS — SYSTOLIC BLOOD PRESSURE: 140 MMHG | DIASTOLIC BLOOD PRESSURE: 61 MMHG

## 2021-08-24 VITALS — SYSTOLIC BLOOD PRESSURE: 120 MMHG | DIASTOLIC BLOOD PRESSURE: 68 MMHG

## 2021-08-24 VITALS — BODY MASS INDEX: 22.75 KG/M2 | WEIGHT: 168 LBS | HEIGHT: 72 IN

## 2021-08-24 VITALS — DIASTOLIC BLOOD PRESSURE: 47 MMHG | SYSTOLIC BLOOD PRESSURE: 95 MMHG

## 2021-08-24 VITALS — SYSTOLIC BLOOD PRESSURE: 138 MMHG | DIASTOLIC BLOOD PRESSURE: 69 MMHG

## 2021-08-24 VITALS — DIASTOLIC BLOOD PRESSURE: 67 MMHG | SYSTOLIC BLOOD PRESSURE: 116 MMHG

## 2021-08-24 VITALS — DIASTOLIC BLOOD PRESSURE: 69 MMHG | SYSTOLIC BLOOD PRESSURE: 138 MMHG

## 2021-08-24 VITALS — SYSTOLIC BLOOD PRESSURE: 153 MMHG | DIASTOLIC BLOOD PRESSURE: 73 MMHG

## 2021-08-24 DIAGNOSIS — Z82.49: ICD-10-CM

## 2021-08-24 DIAGNOSIS — I21.4: Primary | ICD-10-CM

## 2021-08-24 DIAGNOSIS — E78.5: ICD-10-CM

## 2021-08-24 DIAGNOSIS — I13.10: ICD-10-CM

## 2021-08-24 DIAGNOSIS — I25.2: ICD-10-CM

## 2021-08-24 DIAGNOSIS — E78.00: ICD-10-CM

## 2021-08-24 DIAGNOSIS — E66.9: ICD-10-CM

## 2021-08-24 DIAGNOSIS — Z83.3: ICD-10-CM

## 2021-08-24 DIAGNOSIS — Z95.5: ICD-10-CM

## 2021-08-24 DIAGNOSIS — N18.30: ICD-10-CM

## 2021-08-24 DIAGNOSIS — K21.9: ICD-10-CM

## 2021-08-24 DIAGNOSIS — G47.33: ICD-10-CM

## 2021-08-24 DIAGNOSIS — Z98.84: ICD-10-CM

## 2021-08-24 DIAGNOSIS — E11.22: ICD-10-CM

## 2021-08-24 DIAGNOSIS — I25.10: ICD-10-CM

## 2021-08-24 LAB
ALBUMIN SERPL-MCNC: 4.6 G/DL (ref 3.5–5)
ALBUMIN/GLOB SERPL: 1.5 {RATIO} (ref 0.8–2)
ALP SERPL-CCNC: 57 IU/L (ref 40–150)
ALT SERPL-CCNC: 8 IU/L (ref 0–55)
ANION GAP SERPL CALC-SCNC: 18.8 MMOL/L (ref 8–16)
BASOPHILS # BLD AUTO: 0 10*3/UL (ref 0–0.1)
BASOPHILS NFR BLD AUTO: 0.3 % (ref 0–1)
BUN SERPL-MCNC: 44 MG/DL (ref 7–26)
BUN/CREAT SERPL: 29 (ref 6–25)
CALCIUM SERPL-MCNC: 10.7 MG/DL (ref 8.4–10.2)
CHLORIDE SERPL-SCNC: 106 MMOL/L (ref 98–107)
CK MB SERPL-MCNC: 4.8 NG/ML (ref 0–5)
CK MB SERPL-MCNC: 7.3 NG/ML (ref 0–5)
CK SERPL-CCNC: 211 IU/L (ref 29–168)
CK SERPL-CCNC: 428 IU/L (ref 29–168)
CO2 SERPL-SCNC: 21 MMOL/L (ref 22–29)
DEPRECATED APTT PLAS QN: 29.2 SECONDS (ref 23.8–35.5)
DEPRECATED INR PLAS: 0.89
DEPRECATED NEUTROPHILS # BLD AUTO: 4.8 10*3/UL (ref 2.1–6.9)
EGFRCR SERPLBLD CKD-EPI 2021: 34 ML/MIN (ref 60–?)
EOSINOPHIL # BLD AUTO: 0.1 10*3/UL (ref 0–0.4)
EOSINOPHIL NFR BLD AUTO: 1.1 % (ref 0–6)
ERYTHROCYTE [DISTWIDTH] IN CORD BLOOD: 13.6 % (ref 11.7–14.4)
GLOBULIN PLAS-MCNC: 3.1 G/DL (ref 2.3–3.5)
GLUCOSE SERPLBLD-MCNC: 110 MG/DL (ref 74–118)
HCT VFR BLD AUTO: 38.8 % (ref 34.2–44.1)
HGB BLD-MCNC: 12.4 G/DL (ref 12–16)
LYMPHOCYTES # BLD: 0.7 10*3/UL (ref 1–3.2)
LYMPHOCYTES NFR BLD AUTO: 12.1 % (ref 18–39.1)
MAGNESIUM SERPL-MCNC: 1.4 MG/DL (ref 1.3–2.1)
MCH RBC QN AUTO: 28.2 PG (ref 28–32)
MCHC RBC AUTO-ENTMCNC: 32 G/DL (ref 31–35)
MCV RBC AUTO: 88.2 FL (ref 81–99)
MONOCYTES # BLD AUTO: 0.4 10*3/UL (ref 0.2–0.8)
MONOCYTES NFR BLD AUTO: 6.7 % (ref 4.4–11.3)
NEUTS SEG NFR BLD AUTO: 79.1 % (ref 38.7–80)
PLATELET # BLD AUTO: 204 X10E3/UL (ref 140–360)
POTASSIUM SERPL-SCNC: 4.8 MMOL/L (ref 3.5–5.1)
PROTHROMBIN TIME: 12.2 SECONDS (ref 11.9–14.5)
RBC # BLD AUTO: 4.4 X10E6/UL (ref 3.6–5.1)
SODIUM SERPL-SCNC: 141 MMOL/L (ref 136–145)

## 2021-08-24 PROCEDURE — 84484 ASSAY OF TROPONIN QUANT: CPT

## 2021-08-24 PROCEDURE — B2151ZZ FLUOROSCOPY OF LEFT HEART USING LOW OSMOLAR CONTRAST: ICD-10-PCS | Performed by: INTERNAL MEDICINE

## 2021-08-24 PROCEDURE — 85730 THROMBOPLASTIN TIME PARTIAL: CPT

## 2021-08-24 PROCEDURE — 82948 REAGENT STRIP/BLOOD GLUCOSE: CPT

## 2021-08-24 PROCEDURE — 85025 COMPLETE CBC W/AUTO DIFF WBC: CPT

## 2021-08-24 PROCEDURE — 82553 CREATINE MB FRACTION: CPT

## 2021-08-24 PROCEDURE — 99152 MOD SED SAME PHYS/QHP 5/>YRS: CPT

## 2021-08-24 PROCEDURE — 92928 PRQ TCAT PLMT NTRAC ST 1 LES: CPT

## 2021-08-24 PROCEDURE — 93005 ELECTROCARDIOGRAM TRACING: CPT

## 2021-08-24 PROCEDURE — 80061 LIPID PANEL: CPT

## 2021-08-24 PROCEDURE — 93458 L HRT ARTERY/VENTRICLE ANGIO: CPT

## 2021-08-24 PROCEDURE — 36415 COLL VENOUS BLD VENIPUNCTURE: CPT

## 2021-08-24 PROCEDURE — 027135Z DILATION OF CORONARY ARTERY, TWO ARTERIES WITH TWO DRUG-ELUTING INTRALUMINAL DEVICES, PERCUTANEOUS APPROACH: ICD-10-PCS | Performed by: INTERNAL MEDICINE

## 2021-08-24 PROCEDURE — 80053 COMPREHEN METABOLIC PANEL: CPT

## 2021-08-24 PROCEDURE — 4A023N7 MEASUREMENT OF CARDIAC SAMPLING AND PRESSURE, LEFT HEART, PERCUTANEOUS APPROACH: ICD-10-PCS | Performed by: INTERNAL MEDICINE

## 2021-08-24 PROCEDURE — 92929: CPT

## 2021-08-24 PROCEDURE — 83880 ASSAY OF NATRIURETIC PEPTIDE: CPT

## 2021-08-24 PROCEDURE — 85610 PROTHROMBIN TIME: CPT

## 2021-08-24 PROCEDURE — 99153 MOD SED SAME PHYS/QHP EA: CPT

## 2021-08-24 PROCEDURE — B2111ZZ FLUOROSCOPY OF MULTIPLE CORONARY ARTERIES USING LOW OSMOLAR CONTRAST: ICD-10-PCS | Performed by: INTERNAL MEDICINE

## 2021-08-24 PROCEDURE — 82550 ASSAY OF CK (CPK): CPT

## 2021-08-24 PROCEDURE — 83735 ASSAY OF MAGNESIUM: CPT

## 2021-08-24 PROCEDURE — 99284 EMERGENCY DEPT VISIT MOD MDM: CPT

## 2021-08-24 RX ADMIN — CARVEDILOL SCH MG: 12.5 TABLET, FILM COATED ORAL at 18:16

## 2021-08-24 RX ADMIN — INSULIN HUMAN SCH UNIT: 100 INJECTION, SUSPENSION SUBCUTANEOUS at 20:14

## 2021-08-24 RX ADMIN — DOCUSATE SODIUM SCH MG: 100 TABLET, FILM COATED ORAL at 18:16

## 2021-08-25 VITALS — SYSTOLIC BLOOD PRESSURE: 168 MMHG | DIASTOLIC BLOOD PRESSURE: 66 MMHG

## 2021-08-25 VITALS — SYSTOLIC BLOOD PRESSURE: 138 MMHG | DIASTOLIC BLOOD PRESSURE: 70 MMHG

## 2021-08-25 VITALS — DIASTOLIC BLOOD PRESSURE: 71 MMHG | SYSTOLIC BLOOD PRESSURE: 145 MMHG

## 2021-08-25 VITALS — DIASTOLIC BLOOD PRESSURE: 70 MMHG | SYSTOLIC BLOOD PRESSURE: 138 MMHG

## 2021-08-25 LAB
ALBUMIN SERPL-MCNC: 4.1 G/DL (ref 3.5–5)
ALBUMIN/GLOB SERPL: 1.6 {RATIO} (ref 0.8–2)
ALP SERPL-CCNC: 54 IU/L (ref 40–150)
ALT SERPL-CCNC: 7 IU/L (ref 0–55)
ANION GAP SERPL CALC-SCNC: 15.3 MMOL/L (ref 8–16)
BASOPHILS # BLD AUTO: 0.1 10*3/UL (ref 0–0.1)
BASOPHILS NFR BLD AUTO: 1.1 % (ref 0–1)
BUN SERPL-MCNC: 48 MG/DL (ref 7–26)
BUN/CREAT SERPL: 32 (ref 6–25)
CALCIUM SERPL-MCNC: 10 MG/DL (ref 8.4–10.2)
CHLORIDE SERPL-SCNC: 108 MMOL/L (ref 98–107)
CHOLEST SERPL-MCNC: 179 MD/DL (ref 0–199)
CHOLEST/HDLC SERPL: 5.6 {RATIO} (ref 3–3.6)
CK MB SERPL-MCNC: 9.9 NG/ML (ref 0–5)
CK SERPL-CCNC: 376 IU/L (ref 29–168)
CO2 SERPL-SCNC: 21 MMOL/L (ref 22–29)
DEPRECATED NEUTROPHILS # BLD AUTO: 3.3 10*3/UL (ref 2.1–6.9)
EGFRCR SERPLBLD CKD-EPI 2021: 34 ML/MIN (ref 60–?)
EOSINOPHIL # BLD AUTO: 0.1 10*3/UL (ref 0–0.4)
EOSINOPHIL NFR BLD AUTO: 2.7 % (ref 0–6)
ERYTHROCYTE [DISTWIDTH] IN CORD BLOOD: 13.7 % (ref 11.7–14.4)
GLOBULIN PLAS-MCNC: 2.6 G/DL (ref 2.3–3.5)
GLUCOSE SERPLBLD-MCNC: 170 MG/DL (ref 74–118)
HCT VFR BLD AUTO: 32.9 % (ref 34.2–44.1)
HDLC SERPL-MSCNC: 32 MG/DL (ref 40–60)
HGB BLD-MCNC: 10.6 G/DL (ref 12–16)
LYMPHOCYTES # BLD: 0.8 10*3/UL (ref 1–3.2)
LYMPHOCYTES NFR BLD AUTO: 16.6 % (ref 18–39.1)
MCH RBC QN AUTO: 28.3 PG (ref 28–32)
MCHC RBC AUTO-ENTMCNC: 32.2 G/DL (ref 31–35)
MCV RBC AUTO: 88 FL (ref 81–99)
MONOCYTES # BLD AUTO: 0.5 10*3/UL (ref 0.2–0.8)
MONOCYTES NFR BLD AUTO: 10.5 % (ref 4.4–11.3)
NEUTS SEG NFR BLD AUTO: 68.3 % (ref 38.7–80)
PLATELET # BLD AUTO: 172 X10E3/UL (ref 140–360)
POTASSIUM SERPL-SCNC: 4.3 MMOL/L (ref 3.5–5.1)
RBC # BLD AUTO: 3.74 X10E6/UL (ref 3.6–5.1)
SODIUM SERPL-SCNC: 140 MMOL/L (ref 136–145)
TRIGL SERPL-MCNC: 416 MG/DL (ref 0–149)

## 2021-08-25 RX ADMIN — CARVEDILOL SCH MG: 12.5 TABLET, FILM COATED ORAL at 09:01

## 2021-08-25 RX ADMIN — INSULIN HUMAN SCH UNIT: 100 INJECTION, SOLUTION PARENTERAL at 12:20

## 2021-08-25 RX ADMIN — INSULIN HUMAN SCH UNIT: 100 INJECTION, SUSPENSION SUBCUTANEOUS at 08:36

## 2021-08-25 RX ADMIN — INSULIN HUMAN SCH UNIT: 100 INJECTION, SOLUTION PARENTERAL at 08:28

## 2021-08-25 RX ADMIN — DOCUSATE SODIUM SCH MG: 100 TABLET, FILM COATED ORAL at 09:01

## 2022-05-30 ENCOUNTER — HOSPITAL ENCOUNTER (INPATIENT)
Dept: HOSPITAL 88 - ER | Age: 72
LOS: 4 days | Discharge: HOME | DRG: 660 | End: 2022-06-03
Attending: INTERNAL MEDICINE | Admitting: INTERNAL MEDICINE
Payer: MEDICARE

## 2022-05-30 VITALS — SYSTOLIC BLOOD PRESSURE: 147 MMHG | DIASTOLIC BLOOD PRESSURE: 66 MMHG

## 2022-05-30 VITALS — WEIGHT: 173 LBS | BODY MASS INDEX: 33.96 KG/M2 | HEIGHT: 60 IN

## 2022-05-30 VITALS — DIASTOLIC BLOOD PRESSURE: 66 MMHG | SYSTOLIC BLOOD PRESSURE: 147 MMHG

## 2022-05-30 DIAGNOSIS — I13.10: ICD-10-CM

## 2022-05-30 DIAGNOSIS — E11.22: ICD-10-CM

## 2022-05-30 DIAGNOSIS — E78.5: ICD-10-CM

## 2022-05-30 DIAGNOSIS — Z83.3: ICD-10-CM

## 2022-05-30 DIAGNOSIS — Z95.5: ICD-10-CM

## 2022-05-30 DIAGNOSIS — N13.6: Primary | ICD-10-CM

## 2022-05-30 DIAGNOSIS — Z91.19: ICD-10-CM

## 2022-05-30 DIAGNOSIS — K21.9: ICD-10-CM

## 2022-05-30 DIAGNOSIS — N17.9: ICD-10-CM

## 2022-05-30 DIAGNOSIS — G47.33: ICD-10-CM

## 2022-05-30 DIAGNOSIS — Z88.5: ICD-10-CM

## 2022-05-30 DIAGNOSIS — Z82.49: ICD-10-CM

## 2022-05-30 DIAGNOSIS — I25.2: ICD-10-CM

## 2022-05-30 DIAGNOSIS — Z79.4: ICD-10-CM

## 2022-05-30 DIAGNOSIS — I25.82: ICD-10-CM

## 2022-05-30 DIAGNOSIS — E13.69: ICD-10-CM

## 2022-05-30 DIAGNOSIS — I24.9: ICD-10-CM

## 2022-05-30 DIAGNOSIS — N18.32: ICD-10-CM

## 2022-05-30 DIAGNOSIS — Z79.82: ICD-10-CM

## 2022-05-30 DIAGNOSIS — E66.9: ICD-10-CM

## 2022-05-30 DIAGNOSIS — E87.5: ICD-10-CM

## 2022-05-30 DIAGNOSIS — I25.10: ICD-10-CM

## 2022-05-30 LAB
ALBUMIN SERPL-MCNC: 4.1 G/DL (ref 3.5–5)
ALBUMIN/GLOB SERPL: 1.2 {RATIO} (ref 0.8–2)
ALP SERPL-CCNC: 70 IU/L (ref 40–150)
ALT SERPL-CCNC: 10 IU/L (ref 0–55)
ANION GAP SERPL CALC-SCNC: 15.6 MMOL/L (ref 8–16)
BACTERIA URNS QL MICRO: (no result) /HPF
BASOPHILS # BLD AUTO: 0 10*3/UL (ref 0–0.1)
BASOPHILS NFR BLD AUTO: 0.4 % (ref 0–1)
BUN SERPL-MCNC: 37 MG/DL (ref 7–26)
BUN/CREAT SERPL: 22 (ref 6–25)
CALCIUM SERPL-MCNC: 9.9 MG/DL (ref 8.4–10.2)
CHLORIDE SERPL-SCNC: 107 MMOL/L (ref 98–107)
CK MB SERPL-MCNC: 0.7 NG/ML (ref 0–5)
CK SERPL-CCNC: 92 IU/L (ref 29–168)
CLARITY UR: CLEAR
CO2 SERPL-SCNC: 22 MMOL/L (ref 22–29)
COLOR UR: YELLOW
DEPRECATED NEUTROPHILS # BLD AUTO: 8.4 10*3/UL (ref 2.1–6.9)
DEPRECATED RBC URNS MANUAL-ACNC: (no result) /HPF (ref 0–5)
EOSINOPHIL # BLD AUTO: 0.1 10*3/UL (ref 0–0.4)
EOSINOPHIL NFR BLD AUTO: 1.3 % (ref 0–6)
EPI CELLS URNS QL MICRO: (no result) /LPF
ERYTHROCYTE [DISTWIDTH] IN CORD BLOOD: 13.5 % (ref 11.7–14.4)
GLOBULIN PLAS-MCNC: 3.3 G/DL (ref 2.3–3.5)
GLUCOSE SERPLBLD-MCNC: 130 MG/DL (ref 74–118)
HCT VFR BLD AUTO: 40 % (ref 34.2–44.1)
HGB BLD-MCNC: 12.9 G/DL (ref 12–16)
KETONES UR QL STRIP.AUTO: NEGATIVE
LEUKOCYTE ESTERASE UR QL STRIP.AUTO: NEGATIVE
LYMPHOCYTES # BLD: 0.7 10*3/UL (ref 1–3.2)
LYMPHOCYTES NFR BLD AUTO: 7 % (ref 18–39.1)
MCH RBC QN AUTO: 28.1 PG (ref 28–32)
MCHC RBC AUTO-ENTMCNC: 32.3 G/DL (ref 31–35)
MCV RBC AUTO: 87.1 FL (ref 81–99)
MONOCYTES # BLD AUTO: 0.9 10*3/UL (ref 0.2–0.8)
MONOCYTES NFR BLD AUTO: 9 % (ref 4.4–11.3)
NEUTS SEG NFR BLD AUTO: 81.7 % (ref 38.7–80)
NITRITE UR QL STRIP.AUTO: NEGATIVE
PLATELET # BLD AUTO: 211 X10E3/UL (ref 140–360)
POTASSIUM SERPL-SCNC: 4.6 MMOL/L (ref 3.5–5.1)
PROT UR QL STRIP.AUTO: (no result)
RBC # BLD AUTO: 4.59 X10E6/UL (ref 3.6–5.1)
SODIUM SERPL-SCNC: 140 MMOL/L (ref 136–145)
SP GR UR STRIP: 1.02 (ref 1.01–1.02)
UROBILINOGEN UR STRIP-MCNC: 0.2 MG/DL (ref 0.2–1)
WBC #/AREA URNS HPF: (no result) /HPF (ref 0–5)

## 2022-05-30 PROCEDURE — 99152 MOD SED SAME PHYS/QHP 5/>YRS: CPT

## 2022-05-30 PROCEDURE — 80053 COMPREHEN METABOLIC PANEL: CPT

## 2022-05-30 PROCEDURE — B2111ZZ FLUOROSCOPY OF MULTIPLE CORONARY ARTERIES USING LOW OSMOLAR CONTRAST: ICD-10-PCS | Performed by: INTERNAL MEDICINE

## 2022-05-30 PROCEDURE — 82553 CREATINE MB FRACTION: CPT

## 2022-05-30 PROCEDURE — 82550 ASSAY OF CK (CPK): CPT

## 2022-05-30 PROCEDURE — 36415 COLL VENOUS BLD VENIPUNCTURE: CPT

## 2022-05-30 PROCEDURE — 83735 ASSAY OF MAGNESIUM: CPT

## 2022-05-30 PROCEDURE — 99284 EMERGENCY DEPT VISIT MOD MDM: CPT

## 2022-05-30 PROCEDURE — 4A023N7 MEASUREMENT OF CARDIAC SAMPLING AND PRESSURE, LEFT HEART, PERCUTANEOUS APPROACH: ICD-10-PCS | Performed by: INTERNAL MEDICINE

## 2022-05-30 PROCEDURE — 76770 US EXAM ABDO BACK WALL COMP: CPT

## 2022-05-30 PROCEDURE — 87086 URINE CULTURE/COLONY COUNT: CPT

## 2022-05-30 PROCEDURE — 84100 ASSAY OF PHOSPHORUS: CPT

## 2022-05-30 PROCEDURE — 84443 ASSAY THYROID STIM HORMONE: CPT

## 2022-05-30 PROCEDURE — 50590 FRAGMENTING OF KIDNEY STONE: CPT

## 2022-05-30 PROCEDURE — 93458 L HRT ARTERY/VENTRICLE ANGIO: CPT

## 2022-05-30 PROCEDURE — 80048 BASIC METABOLIC PNL TOTAL CA: CPT

## 2022-05-30 PROCEDURE — 93005 ELECTROCARDIOGRAM TRACING: CPT

## 2022-05-30 PROCEDURE — 83036 HEMOGLOBIN GLYCOSYLATED A1C: CPT

## 2022-05-30 PROCEDURE — 96361 HYDRATE IV INFUSION ADD-ON: CPT

## 2022-05-30 PROCEDURE — 82948 REAGENT STRIP/BLOOD GLUCOSE: CPT

## 2022-05-30 PROCEDURE — 85025 COMPLETE CBC W/AUTO DIFF WBC: CPT

## 2022-05-30 PROCEDURE — 84484 ASSAY OF TROPONIN QUANT: CPT

## 2022-05-30 PROCEDURE — 74176 CT ABD & PELVIS W/O CONTRAST: CPT

## 2022-05-30 PROCEDURE — B2151ZZ FLUOROSCOPY OF LEFT HEART USING LOW OSMOLAR CONTRAST: ICD-10-PCS | Performed by: INTERNAL MEDICINE

## 2022-05-30 PROCEDURE — 81001 URINALYSIS AUTO W/SCOPE: CPT

## 2022-05-30 PROCEDURE — 74018 RADEX ABDOMEN 1 VIEW: CPT

## 2022-05-30 RX ADMIN — SODIUM CHLORIDE SCH MLS/HR: 9 INJECTION, SOLUTION INTRAVENOUS at 21:45

## 2022-05-30 RX ADMIN — SODIUM CHLORIDE PRN MG: 900 INJECTION INTRAVENOUS at 19:26

## 2022-05-31 VITALS — DIASTOLIC BLOOD PRESSURE: 63 MMHG | SYSTOLIC BLOOD PRESSURE: 142 MMHG

## 2022-05-31 VITALS — DIASTOLIC BLOOD PRESSURE: 77 MMHG | SYSTOLIC BLOOD PRESSURE: 167 MMHG

## 2022-05-31 VITALS — SYSTOLIC BLOOD PRESSURE: 142 MMHG | DIASTOLIC BLOOD PRESSURE: 63 MMHG

## 2022-05-31 VITALS — SYSTOLIC BLOOD PRESSURE: 143 MMHG | DIASTOLIC BLOOD PRESSURE: 70 MMHG

## 2022-05-31 VITALS — SYSTOLIC BLOOD PRESSURE: 164 MMHG | DIASTOLIC BLOOD PRESSURE: 71 MMHG

## 2022-05-31 LAB
ALBUMIN SERPL-MCNC: 3.2 G/DL (ref 3.5–5)
ALBUMIN/GLOB SERPL: 1.1 {RATIO} (ref 0.8–2)
ALP SERPL-CCNC: 51 IU/L (ref 40–150)
ALT SERPL-CCNC: 6 IU/L (ref 0–55)
ANION GAP SERPL CALC-SCNC: 12.4 MMOL/L (ref 8–16)
BASOPHILS # BLD AUTO: 0 10*3/UL (ref 0–0.1)
BASOPHILS NFR BLD AUTO: 0.4 % (ref 0–1)
BUN SERPL-MCNC: 39 MG/DL (ref 7–26)
BUN/CREAT SERPL: 20 (ref 6–25)
CALCIUM SERPL-MCNC: 8.5 MG/DL (ref 8.4–10.2)
CHLORIDE SERPL-SCNC: 111 MMOL/L (ref 98–107)
CO2 SERPL-SCNC: 22 MMOL/L (ref 22–29)
DEPRECATED NEUTROPHILS # BLD AUTO: 6.2 10*3/UL (ref 2.1–6.9)
EOSINOPHIL # BLD AUTO: 0.2 10*3/UL (ref 0–0.4)
EOSINOPHIL NFR BLD AUTO: 2.2 % (ref 0–6)
ERYTHROCYTE [DISTWIDTH] IN CORD BLOOD: 13.8 % (ref 11.7–14.4)
GLOBULIN PLAS-MCNC: 2.8 G/DL (ref 2.3–3.5)
GLUCOSE SERPLBLD-MCNC: 148 MG/DL (ref 74–118)
HCT VFR BLD AUTO: 33.3 % (ref 34.2–44.1)
HGB BLD-MCNC: 10.5 G/DL (ref 12–16)
LYMPHOCYTES # BLD: 1 10*3/UL (ref 1–3.2)
LYMPHOCYTES NFR BLD AUTO: 12.4 % (ref 18–39.1)
MCH RBC QN AUTO: 28.5 PG (ref 28–32)
MCHC RBC AUTO-ENTMCNC: 31.5 G/DL (ref 31–35)
MCV RBC AUTO: 90.2 FL (ref 81–99)
MONOCYTES # BLD AUTO: 0.8 10*3/UL (ref 0.2–0.8)
MONOCYTES NFR BLD AUTO: 10 % (ref 4.4–11.3)
NEUTS SEG NFR BLD AUTO: 74.6 % (ref 38.7–80)
PLATELET # BLD AUTO: 170 X10E3/UL (ref 140–360)
POTASSIUM SERPL-SCNC: 4.4 MMOL/L (ref 3.5–5.1)
RBC # BLD AUTO: 3.69 X10E6/UL (ref 3.6–5.1)
SODIUM SERPL-SCNC: 141 MMOL/L (ref 136–145)

## 2022-05-31 RX ADMIN — SODIUM CHLORIDE SCH MLS/HR: 9 INJECTION, SOLUTION INTRAVENOUS at 17:45

## 2022-05-31 RX ADMIN — INSULIN LISPRO SCH UNIT: 100 INJECTION, SOLUTION INTRAVENOUS; SUBCUTANEOUS at 16:45

## 2022-05-31 RX ADMIN — SODIUM CHLORIDE SCH MLS/HR: 9 INJECTION, SOLUTION INTRAVENOUS at 20:35

## 2022-05-31 RX ADMIN — Medication PRN MG: at 21:55

## 2022-05-31 RX ADMIN — Medication PRN MG: at 07:50

## 2022-05-31 RX ADMIN — ATORVASTATIN CALCIUM SCH MG: 20 TABLET, FILM COATED ORAL at 20:30

## 2022-05-31 RX ADMIN — SODIUM CHLORIDE PRN MG: 900 INJECTION INTRAVENOUS at 01:17

## 2022-05-31 RX ADMIN — SODIUM CHLORIDE SCH MLS/HR: 9 INJECTION, SOLUTION INTRAVENOUS at 07:55

## 2022-05-31 RX ADMIN — CARVEDILOL SCH MG: 12.5 TABLET, FILM COATED ORAL at 17:45

## 2022-05-31 RX ADMIN — INSULIN LISPRO SCH UNIT: 100 INJECTION, SOLUTION INTRAVENOUS; SUBCUTANEOUS at 20:49

## 2022-05-31 RX ADMIN — SODIUM CHLORIDE PRN MG: 900 INJECTION INTRAVENOUS at 21:55

## 2022-05-31 RX ADMIN — SODIUM CHLORIDE PRN MG: 900 INJECTION INTRAVENOUS at 07:50

## 2022-05-31 RX ADMIN — DOCUSATE SODIUM SCH MG: 100 TABLET, FILM COATED ORAL at 23:57

## 2022-06-01 VITALS — SYSTOLIC BLOOD PRESSURE: 127 MMHG | DIASTOLIC BLOOD PRESSURE: 60 MMHG

## 2022-06-01 VITALS — DIASTOLIC BLOOD PRESSURE: 67 MMHG | SYSTOLIC BLOOD PRESSURE: 146 MMHG

## 2022-06-01 VITALS — SYSTOLIC BLOOD PRESSURE: 133 MMHG | DIASTOLIC BLOOD PRESSURE: 62 MMHG

## 2022-06-01 VITALS — DIASTOLIC BLOOD PRESSURE: 81 MMHG | SYSTOLIC BLOOD PRESSURE: 171 MMHG

## 2022-06-01 VITALS — SYSTOLIC BLOOD PRESSURE: 149 MMHG | DIASTOLIC BLOOD PRESSURE: 74 MMHG

## 2022-06-01 VITALS — DIASTOLIC BLOOD PRESSURE: 74 MMHG | SYSTOLIC BLOOD PRESSURE: 149 MMHG

## 2022-06-01 VITALS — DIASTOLIC BLOOD PRESSURE: 60 MMHG | SYSTOLIC BLOOD PRESSURE: 127 MMHG

## 2022-06-01 VITALS — SYSTOLIC BLOOD PRESSURE: 164 MMHG | DIASTOLIC BLOOD PRESSURE: 72 MMHG

## 2022-06-01 VITALS — SYSTOLIC BLOOD PRESSURE: 157 MMHG | DIASTOLIC BLOOD PRESSURE: 78 MMHG

## 2022-06-01 LAB
ANION GAP SERPL CALC-SCNC: 11.5 MMOL/L (ref 8–16)
ANION GAP SERPL CALC-SCNC: 13 MMOL/L (ref 8–16)
BASOPHILS # BLD AUTO: 0 10*3/UL (ref 0–0.1)
BASOPHILS NFR BLD AUTO: 0.3 % (ref 0–1)
BUN SERPL-MCNC: 36 MG/DL (ref 7–26)
BUN SERPL-MCNC: 36 MG/DL (ref 7–26)
BUN/CREAT SERPL: 16 (ref 6–25)
BUN/CREAT SERPL: 17 (ref 6–25)
CALCIUM SERPL-MCNC: 8.2 MG/DL (ref 8.4–10.2)
CALCIUM SERPL-MCNC: 8.2 MG/DL (ref 8.4–10.2)
CHLORIDE SERPL-SCNC: 110 MMOL/L (ref 98–107)
CHLORIDE SERPL-SCNC: 111 MMOL/L (ref 98–107)
CO2 SERPL-SCNC: 19 MMOL/L (ref 22–29)
CO2 SERPL-SCNC: 21 MMOL/L (ref 22–29)
DEPRECATED NEUTROPHILS # BLD AUTO: 6.4 10*3/UL (ref 2.1–6.9)
EOSINOPHIL # BLD AUTO: 0.2 10*3/UL (ref 0–0.4)
EOSINOPHIL NFR BLD AUTO: 2.5 % (ref 0–6)
ERYTHROCYTE [DISTWIDTH] IN CORD BLOOD: 13.9 % (ref 11.7–14.4)
GLUCOSE SERPLBLD-MCNC: 220 MG/DL (ref 74–118)
GLUCOSE SERPLBLD-MCNC: 263 MG/DL (ref 74–118)
HCT VFR BLD AUTO: 31.2 % (ref 34.2–44.1)
HGB BLD-MCNC: 9.7 G/DL (ref 12–16)
LYMPHOCYTES # BLD: 1 10*3/UL (ref 1–3.2)
LYMPHOCYTES NFR BLD AUTO: 10.9 % (ref 18–39.1)
MCH RBC QN AUTO: 28.3 PG (ref 28–32)
MCHC RBC AUTO-ENTMCNC: 31.1 G/DL (ref 31–35)
MCV RBC AUTO: 91 FL (ref 81–99)
MONOCYTES # BLD AUTO: 1.2 10*3/UL (ref 0.2–0.8)
MONOCYTES NFR BLD AUTO: 13.2 % (ref 4.4–11.3)
NEUTS SEG NFR BLD AUTO: 72.5 % (ref 38.7–80)
PLATELET # BLD AUTO: 146 X10E3/UL (ref 140–360)
POTASSIUM SERPL-SCNC: 4.5 MMOL/L (ref 3.5–5.1)
POTASSIUM SERPL-SCNC: 5 MMOL/L (ref 3.5–5.1)
RBC # BLD AUTO: 3.43 X10E6/UL (ref 3.6–5.1)
SODIUM SERPL-SCNC: 138 MMOL/L (ref 136–145)
SODIUM SERPL-SCNC: 138 MMOL/L (ref 136–145)

## 2022-06-01 RX ADMIN — SODIUM CHLORIDE PRN MG: 900 INJECTION INTRAVENOUS at 15:47

## 2022-06-01 RX ADMIN — INSULIN LISPRO SCH UNIT: 100 INJECTION, SOLUTION INTRAVENOUS; SUBCUTANEOUS at 12:15

## 2022-06-01 RX ADMIN — SODIUM CHLORIDE PRN MG: 900 INJECTION INTRAVENOUS at 05:12

## 2022-06-01 RX ADMIN — ATORVASTATIN CALCIUM SCH MG: 20 TABLET, FILM COATED ORAL at 21:59

## 2022-06-01 RX ADMIN — DOCUSATE SODIUM SCH MG: 100 TABLET, FILM COATED ORAL at 12:24

## 2022-06-01 RX ADMIN — INSULIN LISPRO SCH UNIT: 100 INJECTION, SOLUTION INTRAVENOUS; SUBCUTANEOUS at 17:15

## 2022-06-01 RX ADMIN — INSULIN LISPRO SCH UNIT: 100 INJECTION, SOLUTION INTRAVENOUS; SUBCUTANEOUS at 07:30

## 2022-06-01 RX ADMIN — KETOROLAC TROMETHAMINE PRN MG: 30 INJECTION, SOLUTION INTRAMUSCULAR; INTRAVENOUS at 05:12

## 2022-06-01 RX ADMIN — SODIUM CHLORIDE SCH MLS/HR: 9 INJECTION, SOLUTION INTRAVENOUS at 23:15

## 2022-06-01 RX ADMIN — CARVEDILOL SCH MG: 12.5 TABLET, FILM COATED ORAL at 17:15

## 2022-06-01 RX ADMIN — SODIUM CHLORIDE SCH MLS/HR: 9 INJECTION, SOLUTION INTRAVENOUS at 05:00

## 2022-06-01 RX ADMIN — KETOROLAC TROMETHAMINE PRN MG: 30 INJECTION, SOLUTION INTRAMUSCULAR; INTRAVENOUS at 15:47

## 2022-06-01 RX ADMIN — CARVEDILOL SCH MG: 12.5 TABLET, FILM COATED ORAL at 12:24

## 2022-06-01 RX ADMIN — INSULIN LISPRO SCH UNIT: 100 INJECTION, SOLUTION INTRAVENOUS; SUBCUTANEOUS at 21:00

## 2022-06-02 VITALS — SYSTOLIC BLOOD PRESSURE: 169 MMHG | DIASTOLIC BLOOD PRESSURE: 86 MMHG

## 2022-06-02 VITALS — DIASTOLIC BLOOD PRESSURE: 67 MMHG | SYSTOLIC BLOOD PRESSURE: 138 MMHG

## 2022-06-02 VITALS — SYSTOLIC BLOOD PRESSURE: 160 MMHG | DIASTOLIC BLOOD PRESSURE: 78 MMHG

## 2022-06-02 VITALS — DIASTOLIC BLOOD PRESSURE: 78 MMHG | SYSTOLIC BLOOD PRESSURE: 142 MMHG

## 2022-06-02 VITALS — DIASTOLIC BLOOD PRESSURE: 87 MMHG | SYSTOLIC BLOOD PRESSURE: 147 MMHG

## 2022-06-02 VITALS — SYSTOLIC BLOOD PRESSURE: 158 MMHG | DIASTOLIC BLOOD PRESSURE: 84 MMHG

## 2022-06-02 VITALS — DIASTOLIC BLOOD PRESSURE: 86 MMHG | SYSTOLIC BLOOD PRESSURE: 169 MMHG

## 2022-06-02 VITALS — DIASTOLIC BLOOD PRESSURE: 84 MMHG | SYSTOLIC BLOOD PRESSURE: 158 MMHG

## 2022-06-02 VITALS — SYSTOLIC BLOOD PRESSURE: 151 MMHG | DIASTOLIC BLOOD PRESSURE: 78 MMHG

## 2022-06-02 VITALS — SYSTOLIC BLOOD PRESSURE: 156 MMHG | DIASTOLIC BLOOD PRESSURE: 83 MMHG

## 2022-06-02 VITALS — DIASTOLIC BLOOD PRESSURE: 82 MMHG | SYSTOLIC BLOOD PRESSURE: 157 MMHG

## 2022-06-02 VITALS — DIASTOLIC BLOOD PRESSURE: 82 MMHG | SYSTOLIC BLOOD PRESSURE: 155 MMHG

## 2022-06-02 VITALS — SYSTOLIC BLOOD PRESSURE: 155 MMHG | DIASTOLIC BLOOD PRESSURE: 85 MMHG

## 2022-06-02 VITALS — SYSTOLIC BLOOD PRESSURE: 155 MMHG | DIASTOLIC BLOOD PRESSURE: 87 MMHG

## 2022-06-02 VITALS — SYSTOLIC BLOOD PRESSURE: 172 MMHG | DIASTOLIC BLOOD PRESSURE: 87 MMHG

## 2022-06-02 VITALS — SYSTOLIC BLOOD PRESSURE: 154 MMHG | DIASTOLIC BLOOD PRESSURE: 82 MMHG

## 2022-06-02 VITALS — DIASTOLIC BLOOD PRESSURE: 86 MMHG | SYSTOLIC BLOOD PRESSURE: 186 MMHG

## 2022-06-02 LAB
ALBUMIN SERPL-MCNC: 3.1 G/DL (ref 3.5–5)
ALBUMIN/GLOB SERPL: 0.9 {RATIO} (ref 0.8–2)
ALP SERPL-CCNC: 64 IU/L (ref 40–150)
ALT SERPL-CCNC: 7 IU/L (ref 0–55)
ANION GAP SERPL CALC-SCNC: 14.1 MMOL/L (ref 8–16)
BASOPHILS # BLD AUTO: 0 10*3/UL (ref 0–0.1)
BASOPHILS NFR BLD AUTO: 0.2 % (ref 0–1)
BUN SERPL-MCNC: 33 MG/DL (ref 7–26)
BUN/CREAT SERPL: 16 (ref 6–25)
CALCIUM SERPL-MCNC: 8.8 MG/DL (ref 8.4–10.2)
CHLORIDE SERPL-SCNC: 111 MMOL/L (ref 98–107)
CO2 SERPL-SCNC: 20 MMOL/L (ref 22–29)
DEPRECATED NEUTROPHILS # BLD AUTO: 7.6 10*3/UL (ref 2.1–6.9)
DEPRECATED PHOSPHATE SERPL-MCNC: 2.5 MG/DL (ref 2.3–4.7)
EOSINOPHIL # BLD AUTO: 0.1 10*3/UL (ref 0–0.4)
EOSINOPHIL NFR BLD AUTO: 1.1 % (ref 0–6)
ERYTHROCYTE [DISTWIDTH] IN CORD BLOOD: 13.7 % (ref 11.7–14.4)
GLOBULIN PLAS-MCNC: 3.4 G/DL (ref 2.3–3.5)
GLUCOSE SERPLBLD-MCNC: 255 MG/DL (ref 74–118)
HCT VFR BLD AUTO: 33 % (ref 34.2–44.1)
HGB BLD-MCNC: 10.4 G/DL (ref 12–16)
LYMPHOCYTES # BLD: 0.5 10*3/UL (ref 1–3.2)
LYMPHOCYTES NFR BLD AUTO: 5.2 % (ref 18–39.1)
MAGNESIUM SERPL-MCNC: 1.7 MG/DL (ref 1.3–2.1)
MCH RBC QN AUTO: 27.9 PG (ref 28–32)
MCHC RBC AUTO-ENTMCNC: 31.5 G/DL (ref 31–35)
MCV RBC AUTO: 88.5 FL (ref 81–99)
MONOCYTES # BLD AUTO: 0.7 10*3/UL (ref 0.2–0.8)
MONOCYTES NFR BLD AUTO: 7.8 % (ref 4.4–11.3)
NEUTS SEG NFR BLD AUTO: 85.4 % (ref 38.7–80)
PLATELET # BLD AUTO: 155 X10E3/UL (ref 140–360)
POTASSIUM SERPL-SCNC: 5.1 MMOL/L (ref 3.5–5.1)
RBC # BLD AUTO: 3.73 X10E6/UL (ref 3.6–5.1)
SODIUM SERPL-SCNC: 140 MMOL/L (ref 136–145)
TSH SERPL DL<=0.005 MIU/L-ACNC: 2.1 UIU/ML (ref 0.35–4.94)

## 2022-06-02 RX ADMIN — ATORVASTATIN CALCIUM SCH MG: 20 TABLET, FILM COATED ORAL at 21:05

## 2022-06-02 RX ADMIN — SODIUM CHLORIDE SCH MLS/HR: 9 INJECTION, SOLUTION INTRAVENOUS at 19:15

## 2022-06-02 RX ADMIN — Medication PRN MG: at 08:27

## 2022-06-02 RX ADMIN — SODIUM CHLORIDE PRN MG: 900 INJECTION INTRAVENOUS at 08:27

## 2022-06-02 RX ADMIN — SODIUM CHLORIDE PRN MG: 900 INJECTION INTRAVENOUS at 12:58

## 2022-06-02 RX ADMIN — DOCUSATE SODIUM SCH MG: 100 TABLET, FILM COATED ORAL at 08:19

## 2022-06-02 RX ADMIN — SODIUM CHLORIDE PRN MG: 900 INJECTION INTRAVENOUS at 21:06

## 2022-06-02 RX ADMIN — Medication PRN MG: at 21:06

## 2022-06-02 RX ADMIN — SODIUM CHLORIDE SCH MLS/HR: 9 INJECTION, SOLUTION INTRAVENOUS at 18:17

## 2022-06-02 RX ADMIN — INSULIN LISPRO SCH UNIT: 100 INJECTION, SOLUTION INTRAVENOUS; SUBCUTANEOUS at 11:30

## 2022-06-02 RX ADMIN — INSULIN LISPRO SCH UNIT: 100 INJECTION, SOLUTION INTRAVENOUS; SUBCUTANEOUS at 22:29

## 2022-06-02 RX ADMIN — Medication PRN MG: at 12:59

## 2022-06-02 RX ADMIN — INSULIN LISPRO SCH UNIT: 100 INJECTION, SOLUTION INTRAVENOUS; SUBCUTANEOUS at 18:18

## 2022-06-02 RX ADMIN — INSULIN LISPRO SCH UNIT: 100 INJECTION, SOLUTION INTRAVENOUS; SUBCUTANEOUS at 07:30

## 2022-06-02 RX ADMIN — Medication SCH MG: at 18:17

## 2022-06-02 RX ADMIN — Medication PRN MG: at 00:57

## 2022-06-02 RX ADMIN — CARVEDILOL SCH MG: 12.5 TABLET, FILM COATED ORAL at 18:17

## 2022-06-02 RX ADMIN — SODIUM CHLORIDE PRN MG: 900 INJECTION INTRAVENOUS at 00:57

## 2022-06-02 RX ADMIN — CARVEDILOL SCH MG: 12.5 TABLET, FILM COATED ORAL at 08:20

## 2022-06-03 VITALS — DIASTOLIC BLOOD PRESSURE: 87 MMHG | SYSTOLIC BLOOD PRESSURE: 176 MMHG

## 2022-06-03 VITALS — DIASTOLIC BLOOD PRESSURE: 80 MMHG | SYSTOLIC BLOOD PRESSURE: 166 MMHG

## 2022-06-03 VITALS — SYSTOLIC BLOOD PRESSURE: 167 MMHG | DIASTOLIC BLOOD PRESSURE: 70 MMHG

## 2022-06-03 VITALS — DIASTOLIC BLOOD PRESSURE: 79 MMHG | SYSTOLIC BLOOD PRESSURE: 165 MMHG

## 2022-06-03 LAB
ALBUMIN SERPL-MCNC: 2.8 G/DL (ref 3.5–5)
ALBUMIN/GLOB SERPL: 0.8 {RATIO} (ref 0.8–2)
ALP SERPL-CCNC: 66 IU/L (ref 40–150)
ALT SERPL-CCNC: 6 IU/L (ref 0–55)
ANION GAP SERPL CALC-SCNC: 14.4 MMOL/L (ref 8–16)
BASOPHILS # BLD AUTO: 0.1 10*3/UL (ref 0–0.1)
BASOPHILS NFR BLD AUTO: 0.6 % (ref 0–1)
BUN SERPL-MCNC: 30 MG/DL (ref 7–26)
BUN/CREAT SERPL: 15 (ref 6–25)
CALCIUM SERPL-MCNC: 8.3 MG/DL (ref 8.4–10.2)
CHLORIDE SERPL-SCNC: 111 MMOL/L (ref 98–107)
CO2 SERPL-SCNC: 20 MMOL/L (ref 22–29)
DEPRECATED NEUTROPHILS # BLD AUTO: 7.4 10*3/UL (ref 2.1–6.9)
EOSINOPHIL # BLD AUTO: 0.1 10*3/UL (ref 0–0.4)
EOSINOPHIL NFR BLD AUTO: 1.4 % (ref 0–6)
ERYTHROCYTE [DISTWIDTH] IN CORD BLOOD: 14 % (ref 11.7–14.4)
GLOBULIN PLAS-MCNC: 3.6 G/DL (ref 2.3–3.5)
GLUCOSE SERPLBLD-MCNC: 256 MG/DL (ref 74–118)
HCT VFR BLD AUTO: 28.9 % (ref 34.2–44.1)
HGB BLD-MCNC: 9 G/DL (ref 12–16)
LYMPHOCYTES # BLD: 0.5 10*3/UL (ref 1–3.2)
LYMPHOCYTES NFR BLD AUTO: 5.7 % (ref 18–39.1)
MAGNESIUM SERPL-MCNC: 1.6 MG/DL (ref 1.3–2.1)
MCH RBC QN AUTO: 28.1 PG (ref 28–32)
MCHC RBC AUTO-ENTMCNC: 31.1 G/DL (ref 31–35)
MCV RBC AUTO: 90.3 FL (ref 81–99)
MONOCYTES # BLD AUTO: 0.9 10*3/UL (ref 0.2–0.8)
MONOCYTES NFR BLD AUTO: 10 % (ref 4.4–11.3)
NEUTS SEG NFR BLD AUTO: 81.9 % (ref 38.7–80)
PLATELET # BLD AUTO: 161 X10E3/UL (ref 140–360)
POTASSIUM SERPL-SCNC: 4.4 MMOL/L (ref 3.5–5.1)
RBC # BLD AUTO: 3.2 X10E6/UL (ref 3.6–5.1)
SODIUM SERPL-SCNC: 141 MMOL/L (ref 136–145)

## 2022-06-03 PROCEDURE — 0T778DZ DILATION OF LEFT URETER WITH INTRALUMINAL DEVICE, VIA NATURAL OR ARTIFICIAL OPENING ENDOSCOPIC: ICD-10-PCS | Performed by: UROLOGY

## 2022-06-03 PROCEDURE — 0TF78ZZ FRAGMENTATION IN LEFT URETER, VIA NATURAL OR ARTIFICIAL OPENING ENDOSCOPIC: ICD-10-PCS | Performed by: UROLOGY

## 2022-06-03 PROCEDURE — BT1F1ZZ FLUOROSCOPY OF LEFT KIDNEY, URETER AND BLADDER USING LOW OSMOLAR CONTRAST: ICD-10-PCS | Performed by: UROLOGY

## 2022-06-03 RX ADMIN — INSULIN LISPRO SCH UNIT: 100 INJECTION, SOLUTION INTRAVENOUS; SUBCUTANEOUS at 16:26

## 2022-06-03 RX ADMIN — INSULIN LISPRO SCH UNIT: 100 INJECTION, SOLUTION INTRAVENOUS; SUBCUTANEOUS at 11:30

## 2022-06-03 RX ADMIN — Medication SCH MG: at 11:00

## 2022-06-03 RX ADMIN — Medication PRN MG: at 13:50

## 2022-06-03 RX ADMIN — Medication PRN MG: at 05:49

## 2022-06-03 RX ADMIN — SODIUM CHLORIDE PRN MG: 900 INJECTION INTRAVENOUS at 13:50

## 2022-06-03 RX ADMIN — DOCUSATE SODIUM SCH MG: 100 TABLET, FILM COATED ORAL at 09:00

## 2022-06-03 RX ADMIN — INSULIN LISPRO SCH UNIT: 100 INJECTION, SOLUTION INTRAVENOUS; SUBCUTANEOUS at 07:30

## 2022-06-03 RX ADMIN — CARVEDILOL SCH MG: 12.5 TABLET, FILM COATED ORAL at 11:59

## 2022-06-03 RX ADMIN — SODIUM CHLORIDE PRN MG: 900 INJECTION INTRAVENOUS at 05:50
